# Patient Record
Sex: FEMALE | Race: WHITE | Employment: OTHER | URBAN - METROPOLITAN AREA
[De-identification: names, ages, dates, MRNs, and addresses within clinical notes are randomized per-mention and may not be internally consistent; named-entity substitution may affect disease eponyms.]

---

## 2017-05-09 ENCOUNTER — TELEPHONE (OUTPATIENT)
Dept: FAMILY MEDICINE CLINIC | Facility: CLINIC | Age: 67
End: 2017-05-09

## 2017-05-09 NOTE — TELEPHONE ENCOUNTER
Pt c/o tooth pain, states in the past- s/s related to sinus infection. Requested antibiotic. Pt call transferred to appt desk to get scheduled.     Future Appointments  Date Time Provider Nayeli Ann   5/10/2017 8:45 AM Brad Giraldo MD [facility]

## 2017-05-10 ENCOUNTER — OFFICE VISIT (OUTPATIENT)
Dept: FAMILY MEDICINE CLINIC | Facility: CLINIC | Age: 67
End: 2017-05-10

## 2017-05-10 VITALS
BODY MASS INDEX: 22.47 KG/M2 | RESPIRATION RATE: 12 BRPM | HEART RATE: 80 BPM | HEIGHT: 66 IN | TEMPERATURE: 99 F | WEIGHT: 139.81 LBS | DIASTOLIC BLOOD PRESSURE: 76 MMHG | SYSTOLIC BLOOD PRESSURE: 120 MMHG

## 2017-05-10 DIAGNOSIS — K08.89 TOOTH PAIN: ICD-10-CM

## 2017-05-10 DIAGNOSIS — J01.01 ACUTE RECURRENT MAXILLARY SINUSITIS: Primary | ICD-10-CM

## 2017-05-10 DIAGNOSIS — M77.8 TENDONITIS OF WRIST, LEFT: ICD-10-CM

## 2017-05-10 PROCEDURE — 99214 OFFICE O/P EST MOD 30 MIN: CPT | Performed by: FAMILY MEDICINE

## 2017-05-10 RX ORDER — AMOXICILLIN 500 MG/1
500 CAPSULE ORAL 3 TIMES DAILY
Qty: 30 CAPSULE | Refills: 0 | Status: SHIPPED | OUTPATIENT
Start: 2017-05-10 | End: 2017-05-20

## 2017-05-10 RX ORDER — BLOOD-GLUCOSE METER
EACH MISCELLANEOUS
COMMUNITY
Start: 2016-05-03 | End: 2019-01-05

## 2017-05-10 RX ORDER — FLAXSEED OIL 1000 MG
1 CAPSULE ORAL DAILY
COMMUNITY
Start: 2013-02-25

## 2017-05-10 RX ORDER — ASPIRIN 81 MG/1
81 TABLET, CHEWABLE ORAL DAILY
COMMUNITY
Start: 2014-02-28 | End: 2018-06-08

## 2017-05-10 RX ORDER — CALCIUM CARBONATE 300MG(750)
400 TABLET,CHEWABLE ORAL DAILY
COMMUNITY
Start: 2016-04-06 | End: 2018-06-08 | Stop reason: ALTCHOICE

## 2017-05-10 RX ORDER — FENOFIBRATE 160 MG/1
160 TABLET ORAL DAILY
COMMUNITY
Start: 2010-08-25 | End: 2017-10-18

## 2017-05-10 RX ORDER — AZELAIC ACID 0.15 G/G
GEL TOPICAL AS NEEDED
COMMUNITY
Start: 2011-09-30

## 2017-05-10 RX ORDER — OMEPRAZOLE 40 MG/1
40 CAPSULE, DELAYED RELEASE ORAL DAILY
COMMUNITY
Start: 2016-09-13 | End: 2017-05-13

## 2017-05-10 RX ORDER — BLACK COHOSH ROOT EXTRACT 80 MG
1 CAPSULE ORAL DAILY
COMMUNITY
Start: 2016-04-06 | End: 2019-01-04

## 2017-05-10 RX ORDER — CALCIUM CARBONATE/VITAMIN D3 600 MG-10
1 TABLET ORAL DAILY
COMMUNITY
Start: 2013-02-25 | End: 2018-06-08 | Stop reason: ALTCHOICE

## 2017-05-10 NOTE — PROGRESS NOTES
CHIEF COMPLAINT:   Patient presents with:  Pain: pt feels s/s related to sinuses       HPI:   Fernando Sharma is a 77year old female who presents to clinic today with complaints of TOOTH PAIN  pT WITH ISSUES TWICE IN PAST - treated for sinus and clear Smoking Status: Never Smoker                      Smokeless Status: Never Used                        Alcohol Use: Yes           0.0 oz/week       0 Standard drinks or equivalent per week       Comment: rare- one per month- wine/beer    Drug Use: No system. Tylenol and ibuprofen as appropriate. Call if not improving in 10-14 days to be re-seen. Saline nasal spray or nasal rinses will help with sinus issues. Call if new or worsening symptoms.       rec flonase bid when symptoms start    [id number] [address] for an

## 2017-05-10 NOTE — PATIENT INSTRUCTIONS
Recheck prn    Rest, fluids, hydrate,   Use mucinex DM for nasal congestin and cough. Vitamin c, zinc, and garlic to help your immune system. Tylenol and ibuprofen as appropriate. Call if not improving in 10-14 days to be re-seen.   Saline nasal spray

## 2017-05-13 RX ORDER — OMEPRAZOLE 40 MG/1
CAPSULE, DELAYED RELEASE ORAL
Qty: 90 CAPSULE | Refills: 3 | Status: SHIPPED | OUTPATIENT
Start: 2017-05-13 | End: 2018-05-05

## 2017-10-18 RX ORDER — FENOFIBRATE 160 MG/1
TABLET ORAL
Qty: 90 TABLET | Refills: 0 | Status: SHIPPED | OUTPATIENT
Start: 2017-10-18 | End: 2017-10-30

## 2017-10-18 NOTE — TELEPHONE ENCOUNTER
Future appt:    Last Appointment:  5/10/2017  Last annual exam:  10/24/16  Last lab:  10/25/16  Last refilled:  1/20/17 - #90 with 2 refills was given    Pt informed that she is due for her annual exam  Pt is also needing test strips- request started.   Josse

## 2017-10-24 ENCOUNTER — TELEPHONE (OUTPATIENT)
Dept: FAMILY MEDICINE CLINIC | Facility: CLINIC | Age: 67
End: 2017-10-24

## 2017-10-24 DIAGNOSIS — Z00.00 ANNUAL PHYSICAL EXAM: ICD-10-CM

## 2017-10-24 DIAGNOSIS — E11.9 CONTROLLED TYPE 2 DIABETES MELLITUS WITHOUT COMPLICATION, WITHOUT LONG-TERM CURRENT USE OF INSULIN (HCC): Primary | ICD-10-CM

## 2017-10-24 NOTE — TELEPHONE ENCOUNTER
Future appt:     Your appointments     Date & Time Appointment Department [facility])    Oct 26, 2017  9:15 AM CDT Laboratory Visit with REF [facility] ([facility])    Oct 30, 2017  9:00 AM CDT Medicare Annual Well Visit with Juan M Darden

## 2017-10-24 NOTE — TELEPHONE ENCOUNTER
----- Message from Valerie Campos sent at 10/24/2017  2:20 PM CDT -----  Regarding: lab orders needed   Patient has lab appointment on 10/25/17 could you please put lab orders in system.           Thanks,  Lora

## 2017-10-26 ENCOUNTER — LABORATORY ENCOUNTER (OUTPATIENT)
Dept: LAB | Age: 67
End: 2017-10-26
Attending: FAMILY MEDICINE
Payer: MEDICARE

## 2017-10-26 DIAGNOSIS — E11.9 CONTROLLED TYPE 2 DIABETES MELLITUS WITHOUT COMPLICATION, WITHOUT LONG-TERM CURRENT USE OF INSULIN (HCC): ICD-10-CM

## 2017-10-26 DIAGNOSIS — Z00.00 ANNUAL PHYSICAL EXAM: ICD-10-CM

## 2017-10-26 PROCEDURE — 82306 VITAMIN D 25 HYDROXY: CPT

## 2017-10-26 PROCEDURE — 80053 COMPREHEN METABOLIC PANEL: CPT

## 2017-10-26 PROCEDURE — 83036 HEMOGLOBIN GLYCOSYLATED A1C: CPT

## 2017-10-26 PROCEDURE — 85025 COMPLETE CBC W/AUTO DIFF WBC: CPT

## 2017-10-26 PROCEDURE — 82043 UR ALBUMIN QUANTITATIVE: CPT

## 2017-10-26 PROCEDURE — 81003 URINALYSIS AUTO W/O SCOPE: CPT

## 2017-10-26 PROCEDURE — 82570 ASSAY OF URINE CREATININE: CPT

## 2017-10-26 PROCEDURE — 84443 ASSAY THYROID STIM HORMONE: CPT

## 2017-10-26 PROCEDURE — 80061 LIPID PANEL: CPT

## 2017-10-26 PROCEDURE — 36415 COLL VENOUS BLD VENIPUNCTURE: CPT

## 2017-10-30 ENCOUNTER — OFFICE VISIT (OUTPATIENT)
Dept: FAMILY MEDICINE CLINIC | Facility: CLINIC | Age: 67
End: 2017-10-30

## 2017-10-30 VITALS
WEIGHT: 135.5 LBS | TEMPERATURE: 98 F | BODY MASS INDEX: 22.04 KG/M2 | DIASTOLIC BLOOD PRESSURE: 62 MMHG | HEIGHT: 65.75 IN | RESPIRATION RATE: 16 BRPM | HEART RATE: 62 BPM | SYSTOLIC BLOOD PRESSURE: 124 MMHG

## 2017-10-30 DIAGNOSIS — E11.9 CONTROLLED TYPE 2 DIABETES MELLITUS WITHOUT COMPLICATION, WITHOUT LONG-TERM CURRENT USE OF INSULIN (HCC): ICD-10-CM

## 2017-10-30 DIAGNOSIS — Z23 NEED FOR VACCINATION: ICD-10-CM

## 2017-10-30 DIAGNOSIS — E78.49 FAMILIAL MULTIPLE LIPOPROTEIN-TYPE HYPERLIPIDEMIA: ICD-10-CM

## 2017-10-30 DIAGNOSIS — K21.9 GASTROESOPHAGEAL REFLUX DISEASE WITHOUT ESOPHAGITIS: ICD-10-CM

## 2017-10-30 DIAGNOSIS — Z13.31 DEPRESSION SCREENING: ICD-10-CM

## 2017-10-30 DIAGNOSIS — Z00.00 ENCOUNTER FOR ANNUAL HEALTH EXAMINATION: Primary | ICD-10-CM

## 2017-10-30 DIAGNOSIS — M77.8 TENDONITIS OF WRIST, LEFT: ICD-10-CM

## 2017-10-30 DIAGNOSIS — D12.6 BENIGN NEOPLASM OF COLON, UNSPECIFIED PART OF COLON: ICD-10-CM

## 2017-10-30 DIAGNOSIS — E55.9 VITAMIN D DEFICIENCY: ICD-10-CM

## 2017-10-30 PROBLEM — J01.01 ACUTE RECURRENT MAXILLARY SINUSITIS: Status: RESOLVED | Noted: 2017-05-10 | Resolved: 2017-10-30

## 2017-10-30 PROBLEM — K08.89 TOOTH PAIN: Status: RESOLVED | Noted: 2017-05-10 | Resolved: 2017-10-30

## 2017-10-30 PROCEDURE — G0444 DEPRESSION SCREEN ANNUAL: HCPCS | Performed by: FAMILY MEDICINE

## 2017-10-30 PROCEDURE — 99213 OFFICE O/P EST LOW 20 MIN: CPT | Performed by: FAMILY MEDICINE

## 2017-10-30 PROCEDURE — G0009 ADMIN PNEUMOCOCCAL VACCINE: HCPCS | Performed by: FAMILY MEDICINE

## 2017-10-30 PROCEDURE — 90732 PPSV23 VACC 2 YRS+ SUBQ/IM: CPT | Performed by: FAMILY MEDICINE

## 2017-10-30 PROCEDURE — G0439 PPPS, SUBSEQ VISIT: HCPCS | Performed by: FAMILY MEDICINE

## 2017-10-30 PROCEDURE — 93000 ELECTROCARDIOGRAM COMPLETE: CPT | Performed by: FAMILY MEDICINE

## 2017-10-30 RX ORDER — FENOFIBRATE 160 MG/1
TABLET ORAL
Qty: 90 TABLET | Refills: 3 | Status: SHIPPED | OUTPATIENT
Start: 2017-10-30 | End: 2018-11-16

## 2017-10-30 NOTE — PROGRESS NOTES
CC: Annual Physical Exam    HPI:   Cuate Harrison is a 79year old female who presents for a complete physical exam.     Patient generally feeling well. Patient monitoring her glucose.   She does note that number seems to be higher in the hemoglobin A1c REFLEX TO FREE T4   Result Value Ref Range   TSH 2.820 0.350 - 5.500 mIU/mL   -URINALYSIS WITH CULTURE REFLEX   Result Value Ref Range   Urine Color Straw Yellow   Clarity Urine Clear Clear   Spec Gravity <1.005 1.001 - 1.030   Glucose Urine Negative Negat tablet Rfl: 3   Glucose Blood In Vitro Strip DX:  E11.9- testing once daily Disp: 100 strip Rfl: 0   OMEPRAZOLE 40 MG Oral Capsule Delayed Release TAKE ONE CAPSULE BY MOUTH ONCE DAILY Disp: 90 capsule Rfl: 3   aspirin 81 MG Oral Chew Tab Chew 81 mg by [employer] vision or yellow sclerae. Ears, Nose, Throat:  Denies hearing loss, sneezing, congestion, runny nose or sore throat. INTEGUMENTARY:  Denies rashes, itching, skin lesion, or excessive skin dryness.   CARDIOVASCULAR:  Denies chest pain, chest pressure, chest Supple, no JVD, no carotid bruit, no thyromegaly. SKIN: No rashes, no skin lesion, no bruising, good turgor. HEART:  Regular rate and rhythm, no murmurs, rubs or gallops. LUNGS: Clear to auscultation bilterally, no rales/rhonchi/wheezing.   BREAST: No sk OFFICE/OUTPT VISIT,EST,LEVL III    5. Gastroesophageal reflux disease without esophagitis  Stable with medications  - OFFICE/OUTPT VISIT,EST,LEVL III    6. Tendonitis of wrist, left      7. Depression screening    - DEPRESSION SCREEN ANNUAL    8.  Vitamin D found.     Fasting Blood Sugar (FSB)   Patient must be diagnosed with one of the following:   • Hypertension   • Dyslipidemia   • Obesity (BMI ³30 kg/m2)   • Previous elevated impaired FBS or GTT   … or any two of the following:   • Overweight (BMI ³25 but annually for Diabetics, people with Glaucoma family history,   Americans over age 48   Americans over age 72 No flowsheet data found.  OK to schedule if you are in this risk group, make sure you have a referral   Bone Density Screening      B Tetanus Toxoid- Only covered with a cut with metal- TD and TDaP Not covered by Medicare Part B) No orders found for this or any previous visit.  This may be covered with your prescription benefits, but Medicare does not cover unless Medically needed    Zost DENSITOMETRY (CPT=77080)    The patient is asked to return in 6 month.

## 2017-10-30 NOTE — PATIENT INSTRUCTIONS
Fasting lab results reviewed    Increase dose of metformin, monitor for looser stools on higher dose of medication    Rec vit D 2000 IU a day    Results of mammogram pending    Encourage heart healthy diet, continue fenofibrate, statin and lopid failed in 65-75) IPPE only No results found for this or any previous visit.  Limited to patients who meet one of the following criteria:   • Men who are 73-68 years old and have smoked more than 100 cigarettes in their lifetime   • Anyone with a family history    Col 10/26/2018 Please get this Mammogram regularly   Immunizations      Influenza  Covered Annually No orders found for this or any previous visit.  Please get every year    Pneumococcal 13 (Prevnar)  Covered Once after 65 No orders found for this or any previo

## 2017-11-02 ENCOUNTER — MED REC SCAN ONLY (OUTPATIENT)
Dept: FAMILY MEDICINE CLINIC | Facility: CLINIC | Age: 67
End: 2017-11-02

## 2017-12-05 ENCOUNTER — TELEPHONE (OUTPATIENT)
Dept: FAMILY MEDICINE CLINIC | Facility: CLINIC | Age: 67
End: 2017-12-05

## 2017-12-05 NOTE — TELEPHONE ENCOUNTER
RX for testing supplies signed and faxed today- with instructions that pt is testing once daily- 6 month supply was approved. Pt told pharmacy that she is testing twice daily. Pharmacy needs authorization to increase testing instructions. .  Pt last seen

## 2017-12-05 NOTE — TELEPHONE ENCOUNTER
Pharmacy had to do an insurnace review due to her diabetic history. Pharmacy is asking why pt is not taking a statin -  They are having to follow ADA recommendations re: prevention of heart attack and stroke.     Pharmacy noticed pt is taking Fenobrate -

## 2017-12-05 NOTE — TELEPHONE ENCOUNTER
Pt has hx of hypertriglyceridemia . Triglycerides > 500. Pt lipids panel responsive to current medications with triglycerides and cholesterol in target range. We were not able to achieve this goal with statin therapy.   Pt should continue on present medicat

## 2017-12-11 ENCOUNTER — TELEPHONE (OUTPATIENT)
Dept: FAMILY MEDICINE CLINIC | Facility: CLINIC | Age: 67
End: 2017-12-11

## 2017-12-11 NOTE — TELEPHONE ENCOUNTER
Spoke with pharmacy, since pt is testing BID- they need quantity for strips increased to #200.   They will refax RX request.

## 2017-12-12 NOTE — TELEPHONE ENCOUNTER
Requested RX faxed for One Touch Ultra testing supplies. Pt is non-insulin- checking blood sugars BID. #200 w/ 3 refills approved - dx: E11.9  RX from [facility] completed and faxed back.

## 2017-12-19 ENCOUNTER — PATIENT OUTREACH (OUTPATIENT)
Dept: FAMILY MEDICINE CLINIC | Facility: CLINIC | Age: 67
End: 2017-12-19

## 2017-12-20 ENCOUNTER — OFFICE VISIT (OUTPATIENT)
Dept: FAMILY MEDICINE CLINIC | Facility: CLINIC | Age: 67
End: 2017-12-20

## 2017-12-20 VITALS
TEMPERATURE: 97 F | BODY MASS INDEX: 21.69 KG/M2 | HEART RATE: 72 BPM | RESPIRATION RATE: 20 BRPM | HEIGHT: 65.75 IN | WEIGHT: 133.38 LBS | DIASTOLIC BLOOD PRESSURE: 50 MMHG | SYSTOLIC BLOOD PRESSURE: 120 MMHG

## 2017-12-20 DIAGNOSIS — J02.9 SORE THROAT: Primary | ICD-10-CM

## 2017-12-20 PROCEDURE — 87081 CULTURE SCREEN ONLY: CPT | Performed by: NURSE PRACTITIONER

## 2017-12-20 PROCEDURE — 87880 STREP A ASSAY W/OPTIC: CPT | Performed by: NURSE PRACTITIONER

## 2017-12-20 PROCEDURE — 99213 OFFICE O/P EST LOW 20 MIN: CPT | Performed by: NURSE PRACTITIONER

## 2017-12-20 NOTE — PROGRESS NOTES
HPI:    Patient ID: Janna Brown is a 79year old female. HPI     Works at [employer] and has been exposed to several who are sick. Has a ST for the past 3 days. Otherwise feels ok. Review of Systems   Constitutional: Negative.     HENT: Pos well-nourished. No distress. HENT:   Head: Normocephalic and atraumatic. Right Ear: Hearing, tympanic membrane, external ear and ear canal normal.   Left Ear: Hearing, tympanic membrane, external ear and ear canal normal.   Nose: Mucosal edema present.

## 2017-12-20 NOTE — PATIENT INSTRUCTIONS
Rapid strep negative, culture pending.    Take acetaminophen or ibuprofen for fever/discomfort  Drink plenty of fluids, warm liquids  Decongestants for congestion  Expectorant and/or cough suppressant  Use saline drops as needed  Use cool mist vaporizer to

## 2017-12-22 ENCOUNTER — TELEPHONE (OUTPATIENT)
Dept: FAMILY MEDICINE CLINIC | Facility: CLINIC | Age: 67
End: 2017-12-22

## 2017-12-22 NOTE — TELEPHONE ENCOUNTER
----- Message from Gladstone Cowden, APN sent at 12/22/2017  8:25 AM CST -----  Note to my chart. Strep culture is negative.

## 2018-02-14 ENCOUNTER — TELEPHONE (OUTPATIENT)
Dept: FAMILY MEDICINE CLINIC | Facility: CLINIC | Age: 68
End: 2018-02-14

## 2018-02-14 NOTE — TELEPHONE ENCOUNTER
Blood Sugar Log reviewed per CR dated 1/1/8- 2/14/18  AM- BS range:  107-165. Per CR- reviewed- pt to continue present management. Left detailed message for pt- asked Luly Toribio to call back with any questions/concerns.

## 2018-04-27 DIAGNOSIS — E11.9 CONTROLLED TYPE 2 DIABETES MELLITUS WITHOUT COMPLICATION, WITHOUT LONG-TERM CURRENT USE OF INSULIN (HCC): Primary | ICD-10-CM

## 2018-04-27 NOTE — TELEPHONE ENCOUNTER
Future appt:   Patient was instructed to return 6 months from last OV (10/30/17)    Last Appointment:  10/30/2017    Medication last refilled on 10/30/17    Cholesterol, Total (mg/dL)   Date Value   10/26/2017 161   ----------  HDL Cholesterol (mg/dL)

## 2018-04-28 NOTE — TELEPHONE ENCOUNTER
Patient here for appointment with her mother - seeing Dr. Andreas Suárez  Patient notified and expressed understanding  Will make appointment on the way out today    Ford Horne, 04/28/18, 10:27 AM

## 2018-05-05 RX ORDER — OMEPRAZOLE 40 MG/1
CAPSULE, DELAYED RELEASE ORAL
Qty: 90 CAPSULE | Refills: 3 | Status: SHIPPED | OUTPATIENT
Start: 2018-05-05 | End: 2018-06-08 | Stop reason: ALTCHOICE

## 2018-05-05 NOTE — TELEPHONE ENCOUNTER
Refill request from [facility] for omeprazole dr 40 mg capsule. Future appt:     Your appointments     Date & Time Appointment Department [facility])    Jun 04, 2018  8:45 AM CDT Laboratory Visit with REF [facility] ([facility])

## 2018-05-23 ENCOUNTER — TELEPHONE (OUTPATIENT)
Dept: FAMILY MEDICINE CLINIC | Facility: CLINIC | Age: 68
End: 2018-05-23

## 2018-05-23 NOTE — TELEPHONE ENCOUNTER
Pt states that she has had back pain and left shoulder since Saturday. Pt states that she has been seen by PT and chiropractor and has not had any relief. Wants to know if she can be prescribed a muscle relaxant.

## 2018-05-23 NOTE — TELEPHONE ENCOUNTER
I have no record of this being evaluated here.  She would need an appointment to start a muscle relaxant if indicated at time of exam.

## 2018-05-23 NOTE — TELEPHONE ENCOUNTER
Pt notified and verbalized understanding. She states that she is seeing PT again tomorrow and will see if that helps before scheduling an appt here.

## 2018-05-23 NOTE — TELEPHONE ENCOUNTER
Pt states that she has a \"knot\" in the muscle under her left shoulder blade since Saturday. She has seen PT x 2 and a chiropractor with no improvement. She is asking if you think that a muscle relaxant would be helpful. Please advise.

## 2018-06-04 ENCOUNTER — APPOINTMENT (OUTPATIENT)
Dept: LAB | Age: 68
End: 2018-06-04
Attending: FAMILY MEDICINE
Payer: MEDICARE

## 2018-06-04 DIAGNOSIS — E11.9 CONTROLLED TYPE 2 DIABETES MELLITUS WITHOUT COMPLICATION, WITHOUT LONG-TERM CURRENT USE OF INSULIN (HCC): ICD-10-CM

## 2018-06-04 LAB
ALBUMIN SERPL-MCNC: 4.3 G/DL (ref 3.5–4.8)
ALP LIVER SERPL-CCNC: 40 U/L (ref 55–142)
ALT SERPL-CCNC: 33 U/L (ref 14–54)
AST SERPL-CCNC: 24 U/L (ref 15–41)
BILIRUB SERPL-MCNC: 0.4 MG/DL (ref 0.1–2)
BILIRUB UR QL STRIP.AUTO: NEGATIVE
BUN BLD-MCNC: 16 MG/DL (ref 8–20)
CALCIUM BLD-MCNC: 9.5 MG/DL (ref 8.3–10.3)
CHLORIDE: 106 MMOL/L (ref 101–111)
CHOLEST SMN-MCNC: 156 MG/DL (ref ?–200)
CLARITY UR REFRACT.AUTO: CLEAR
CO2: 28 MMOL/L (ref 22–32)
CREAT BLD-MCNC: 0.74 MG/DL (ref 0.55–1.02)
EST. AVERAGE GLUCOSE BLD GHB EST-MCNC: 146 MG/DL (ref 68–126)
GLUCOSE BLD-MCNC: 110 MG/DL (ref 70–99)
GLUCOSE UR STRIP.AUTO-MCNC: NEGATIVE MG/DL
HBA1C MFR BLD HPLC: 6.7 % (ref ?–5.7)
HDLC SERPL-MCNC: 37 MG/DL (ref 45–?)
HDLC SERPL: 4.22 {RATIO} (ref ?–4.44)
KETONES UR STRIP.AUTO-MCNC: NEGATIVE MG/DL
LDLC SERPL CALC-MCNC: 91 MG/DL (ref ?–130)
LEUKOCYTE ESTERASE UR QL STRIP.AUTO: NEGATIVE
M PROTEIN MFR SERPL ELPH: 7.9 G/DL (ref 6.1–8.3)
NITRITE UR QL STRIP.AUTO: NEGATIVE
NONHDLC SERPL-MCNC: 119 MG/DL (ref ?–130)
PH UR STRIP.AUTO: 8 [PH] (ref 4.5–8)
POTASSIUM SERPL-SCNC: 4.4 MMOL/L (ref 3.6–5.1)
PROT UR STRIP.AUTO-MCNC: NEGATIVE MG/DL
RBC UR QL AUTO: NEGATIVE
SODIUM SERPL-SCNC: 141 MMOL/L (ref 136–144)
SP GR UR STRIP.AUTO: <1.005 (ref 1–1.03)
TRIGL SERPL-MCNC: 139 MG/DL (ref ?–150)
UROBILINOGEN UR STRIP.AUTO-MCNC: <2 MG/DL
VLDLC SERPL CALC-MCNC: 28 MG/DL (ref 5–40)

## 2018-06-04 PROCEDURE — 81003 URINALYSIS AUTO W/O SCOPE: CPT

## 2018-06-04 PROCEDURE — 80053 COMPREHEN METABOLIC PANEL: CPT

## 2018-06-04 PROCEDURE — 80061 LIPID PANEL: CPT

## 2018-06-04 PROCEDURE — 36415 COLL VENOUS BLD VENIPUNCTURE: CPT

## 2018-06-04 PROCEDURE — 83036 HEMOGLOBIN GLYCOSYLATED A1C: CPT

## 2018-06-08 ENCOUNTER — OFFICE VISIT (OUTPATIENT)
Dept: FAMILY MEDICINE CLINIC | Facility: CLINIC | Age: 68
End: 2018-06-08

## 2018-06-08 VITALS
TEMPERATURE: 97 F | DIASTOLIC BLOOD PRESSURE: 80 MMHG | HEIGHT: 65.75 IN | OXYGEN SATURATION: 98 % | RESPIRATION RATE: 16 BRPM | SYSTOLIC BLOOD PRESSURE: 136 MMHG | WEIGHT: 133.63 LBS | BODY MASS INDEX: 21.73 KG/M2 | HEART RATE: 74 BPM

## 2018-06-08 DIAGNOSIS — E11.9 CONTROLLED TYPE 2 DIABETES MELLITUS WITHOUT COMPLICATION, WITHOUT LONG-TERM CURRENT USE OF INSULIN (HCC): Primary | ICD-10-CM

## 2018-06-08 DIAGNOSIS — Z13.820 SCREENING FOR OSTEOPOROSIS: ICD-10-CM

## 2018-06-08 DIAGNOSIS — E78.49 FAMILIAL MULTIPLE LIPOPROTEIN-TYPE HYPERLIPIDEMIA: ICD-10-CM

## 2018-06-08 DIAGNOSIS — D12.6 BENIGN NEOPLASM OF COLON, UNSPECIFIED PART OF COLON: ICD-10-CM

## 2018-06-08 DIAGNOSIS — K27.9 PUD (PEPTIC ULCER DISEASE): ICD-10-CM

## 2018-06-08 DIAGNOSIS — M77.8 TENDONITIS OF WRIST, LEFT: ICD-10-CM

## 2018-06-08 DIAGNOSIS — K21.9 GASTROESOPHAGEAL REFLUX DISEASE WITHOUT ESOPHAGITIS: ICD-10-CM

## 2018-06-08 PROBLEM — K63.5 COLON POLYP: Status: ACTIVE | Noted: 2018-06-08

## 2018-06-08 PROCEDURE — 99214 OFFICE O/P EST MOD 30 MIN: CPT | Performed by: FAMILY MEDICINE

## 2018-06-08 RX ORDER — PANTOPRAZOLE SODIUM 40 MG/1
40 TABLET, DELAYED RELEASE ORAL DAILY
COMMUNITY
Start: 2018-05-22 | End: 2020-06-15

## 2018-06-08 RX ORDER — MULTIVITAMIN
1 TABLET ORAL DAILY
COMMUNITY
End: 2019-01-04

## 2018-06-08 NOTE — PROGRESS NOTES
[place] [facility] [place]  PROGRESS NOTE  Chief Complaint:   Patient presents with:  Medication Follow-Up      HPI:   This is a 79year old female coming in for diabetic  Labs reviewed.   Pt retired 5 days ago- happt and building new home  MIL now in [employer] Urine Negative Negative mg/dl   Urobilinogen Urine <2.0 0.2 - 2.0 mg/dL   Nitrite Urine Negative Negative   Leukocyte Esterase Urine Negative Negative   Microscopic Microscopic not indicated        Wt Readings from Last 6 Encounters:  06/08/18 : 133 lb 9.6 testing two times per day-  Dx:  E11.9 Disp:  Rfl:    Flaxseed, Linseed, (RA FLAX SEED OIL 1000) 1000 MG Oral Cap Take 1 capsule by mouth daily.    Disp:  Rfl:    ONETOUCH DELICA LANCETS FINE Does not apply Misc Testing BID-  Dx:  E11.9 Disp:  Rfl:    Lacto encounter: 133 lb 9.6 oz. Vital signs reviewed. Appears stated age, well groomed. Physical Exam:  GEN:  Patient is alert, awake and oriented, well developed, well nourished, no apparent distress.   HEENT:  Head:  Normocephalic, atraumatic Eyes: EOMI, PERR and meds to manage diabetes      Continue massage left shoulder    Recheck labs - diabetes 4-6 months    Physical and f.u early November      Meds & Refills for this Visit:    No prescriptions requested or ordered in this encounter       Health Maintenance

## 2018-06-08 NOTE — PROGRESS NOTES
[facility] [place]  PROGRESS NOTE  Chief Complaint:   Patient presents with:  Medication Follow-Up      HPI:   This is a 79year old female coming in for  HPI    Results for orders placed or performed in visit on 95/42/22  -COMP METABOLIC PANEL Surgical History:  2013: COLONOSCOPY  Social History:  Social History    Marital status:              Spouse name:                       Years of education:                 Number of children:               Social History Main Topics    Smoking stat Answered       REVIEW OF SYSTEMS:   CONSTITUTIONAL:  Denies unusual weight gain/loss, fever, chills, or fatigue. EENT:  Eyes:  Denies eye pain, visual loss, blurred vision, double vision or yellow sclerae.  Ears, Nose, Throat:  Denies hearing loss, sneezin discharge Throat:  No tonsillar erythema or exudate. Mouth:  No oral lesions or ulcerations, good dentition. NECK: Supple, no thyromegaly. SKIN: No rashes, no skin lesion, no bruising, good turgor.   HEART:  Regular rate and rhythm, no murmurs, rubs or g

## 2018-06-08 NOTE — PATIENT INSTRUCTIONS
f/u RAGHAV re tendonitiis    NO aspirin or NSAID  EGD 3 months    Continue diet and meds to manage diabetes      Continue massage left shoulder    Recheck labs - diabetes 4-6 months    Physical and f.u early November

## 2018-08-31 ENCOUNTER — TELEPHONE (OUTPATIENT)
Dept: FAMILY MEDICINE CLINIC | Facility: CLINIC | Age: 68
End: 2018-08-31

## 2018-08-31 DIAGNOSIS — E11.9 CONTROLLED TYPE 2 DIABETES MELLITUS WITHOUT COMPLICATION, WITHOUT LONG-TERM CURRENT USE OF INSULIN (HCC): Primary | ICD-10-CM

## 2018-08-31 DIAGNOSIS — E78.49 FAMILIAL MULTIPLE LIPOPROTEIN-TYPE HYPERLIPIDEMIA: ICD-10-CM

## 2018-08-31 NOTE — TELEPHONE ENCOUNTER
Patient scheduled her medicare wellness for 11/5/18, need an order for her lab work which is scheduled 11/3/18.   Ty

## 2018-08-31 NOTE — TELEPHONE ENCOUNTER
Future appt:     Your appointments     Date & Time Appointment Department [facility])    Nov 02, 2018  8:45 AM CDT Laboratory Visit with REF [facility] ([facility])    Nov 05, 2018  9:00 AM CST Medicare Annual Well Visit with Franklin Henriquez

## 2018-09-04 NOTE — TELEPHONE ENCOUNTER
Future appt:     Your appointments     Date & Time Appointment Department [facility])    Nov 02, 2018  8:45 AM CDT Laboratory Visit with REF [facility] ([facility])    Nov 05, 2018  9:00 AM CST Medicare Annual Well Visit with Yuki Dean

## 2018-10-30 ENCOUNTER — TELEPHONE (OUTPATIENT)
Dept: FAMILY MEDICINE CLINIC | Facility: CLINIC | Age: 68
End: 2018-10-30

## 2018-10-30 NOTE — TELEPHONE ENCOUNTER
Future Appointments   Date Time Provider Nayeli Ann   11/2/2018  8:45 AM [facility] [place] [facility]   11/5/2018  9:00 AM Lissette Curiel MD [facility] [place] [facility]     Pt states she would like to have additional labs ordered to check for

## 2018-10-30 NOTE — TELEPHONE ENCOUNTER
Pt informed- states she will discuss further at upcoming appt. Pt wanted to keep appt as is at this time.

## 2018-10-30 NOTE — TELEPHONE ENCOUNTER
Pt may want to get orders from ortho if they were suggesting . Please have her request records for our review. Pt has physical scheduled next week.  Does pt want to change her appt to problem focused or make another appt for wrist issues and keep her physic

## 2018-11-02 ENCOUNTER — LABORATORY ENCOUNTER (OUTPATIENT)
Dept: LAB | Age: 68
End: 2018-11-02
Attending: FAMILY MEDICINE
Payer: MEDICARE

## 2018-11-02 DIAGNOSIS — E78.49 FAMILIAL MULTIPLE LIPOPROTEIN-TYPE HYPERLIPIDEMIA: ICD-10-CM

## 2018-11-02 DIAGNOSIS — E11.9 CONTROLLED TYPE 2 DIABETES MELLITUS WITHOUT COMPLICATION, WITHOUT LONG-TERM CURRENT USE OF INSULIN (HCC): ICD-10-CM

## 2018-11-02 PROCEDURE — 82570 ASSAY OF URINE CREATININE: CPT

## 2018-11-02 PROCEDURE — 84443 ASSAY THYROID STIM HORMONE: CPT

## 2018-11-02 PROCEDURE — 80053 COMPREHEN METABOLIC PANEL: CPT

## 2018-11-02 PROCEDURE — 36415 COLL VENOUS BLD VENIPUNCTURE: CPT

## 2018-11-02 PROCEDURE — 80061 LIPID PANEL: CPT

## 2018-11-02 PROCEDURE — 82043 UR ALBUMIN QUANTITATIVE: CPT

## 2018-11-02 PROCEDURE — 83036 HEMOGLOBIN GLYCOSYLATED A1C: CPT

## 2018-11-02 PROCEDURE — 85025 COMPLETE CBC W/AUTO DIFF WBC: CPT

## 2018-11-02 PROCEDURE — 81003 URINALYSIS AUTO W/O SCOPE: CPT

## 2018-11-05 ENCOUNTER — APPOINTMENT (OUTPATIENT)
Dept: LAB | Age: 68
End: 2018-11-05
Attending: FAMILY MEDICINE
Payer: COMMERCIAL

## 2018-11-05 ENCOUNTER — OFFICE VISIT (OUTPATIENT)
Dept: FAMILY MEDICINE CLINIC | Facility: CLINIC | Age: 68
End: 2018-11-05
Payer: MEDICARE

## 2018-11-05 VITALS
WEIGHT: 133.63 LBS | RESPIRATION RATE: 16 BRPM | BODY MASS INDEX: 22 KG/M2 | TEMPERATURE: 97 F | SYSTOLIC BLOOD PRESSURE: 118 MMHG | HEART RATE: 80 BPM | DIASTOLIC BLOOD PRESSURE: 66 MMHG | HEIGHT: 65.5 IN

## 2018-11-05 DIAGNOSIS — E78.49 FAMILIAL MULTIPLE LIPOPROTEIN-TYPE HYPERLIPIDEMIA: ICD-10-CM

## 2018-11-05 DIAGNOSIS — K21.9 GASTROESOPHAGEAL REFLUX DISEASE WITHOUT ESOPHAGITIS: ICD-10-CM

## 2018-11-05 DIAGNOSIS — R91.1 PULMONARY NODULE: ICD-10-CM

## 2018-11-05 DIAGNOSIS — D12.2 ADENOMATOUS POLYP OF ASCENDING COLON: ICD-10-CM

## 2018-11-05 DIAGNOSIS — E11.9 CONTROLLED TYPE 2 DIABETES MELLITUS WITHOUT COMPLICATION, WITHOUT LONG-TERM CURRENT USE OF INSULIN (HCC): ICD-10-CM

## 2018-11-05 DIAGNOSIS — Z00.00 ENCOUNTER FOR ANNUAL HEALTH EXAMINATION: Primary | ICD-10-CM

## 2018-11-05 DIAGNOSIS — K27.9 PUD (PEPTIC ULCER DISEASE): ICD-10-CM

## 2018-11-05 DIAGNOSIS — M77.8 TENDONITIS OF WRIST, LEFT: ICD-10-CM

## 2018-11-05 DIAGNOSIS — Z13.31 DEPRESSION SCREENING: ICD-10-CM

## 2018-11-05 PROCEDURE — 86038 ANTINUCLEAR ANTIBODIES: CPT | Performed by: FAMILY MEDICINE

## 2018-11-05 PROCEDURE — G0439 PPPS, SUBSEQ VISIT: HCPCS | Performed by: FAMILY MEDICINE

## 2018-11-05 PROCEDURE — 84550 ASSAY OF BLOOD/URIC ACID: CPT | Performed by: FAMILY MEDICINE

## 2018-11-05 PROCEDURE — 86200 CCP ANTIBODY: CPT | Performed by: FAMILY MEDICINE

## 2018-11-05 PROCEDURE — 99212 OFFICE O/P EST SF 10 MIN: CPT | Performed by: FAMILY MEDICINE

## 2018-11-05 PROCEDURE — 85652 RBC SED RATE AUTOMATED: CPT | Performed by: FAMILY MEDICINE

## 2018-11-05 PROCEDURE — 86140 C-REACTIVE PROTEIN: CPT | Performed by: FAMILY MEDICINE

## 2018-11-05 PROCEDURE — 86431 RHEUMATOID FACTOR QUANT: CPT | Performed by: FAMILY MEDICINE

## 2018-11-05 PROCEDURE — G0444 DEPRESSION SCREEN ANNUAL: HCPCS | Performed by: FAMILY MEDICINE

## 2018-11-05 PROCEDURE — 36415 COLL VENOUS BLD VENIPUNCTURE: CPT | Performed by: FAMILY MEDICINE

## 2018-11-05 NOTE — PROGRESS NOTES
CC: Annual Physical Exam    HPI:   Anuj Pozo is a 76year old female who presents for a complete physical exam.    Moved to new home this week, [place] property, will be tearing down old home. continued left wrist tendinitis.  Pt cared fo Friends    If you are a male age 38-65 or a female age 47-67, do you take aspirin?: No    Have you had any immunizations at another office such as Influenza, Hepatitis B, Tetanus, or Pneumococcal?: Yes     Functional Ability     Bathing or Showering: Able for orders placed or performed in visit on 11/05/18   RHEUMATOID ARTHRITIS FACTOR   Result Value Ref Range    Rheumatoid Factor <10 <15 IU/mL   URIC ACID, SERUM   Result Value Ref Range    Uric Acid 3.4 2.4 - 8.0 mg/dL   SED RATE, [place] (AUTOMATED) Grandmother       Social History:   Social History    Socioeconomic History      Marital status:       Spouse name: Not on file      Number of children: Not on file      Years of education: Not on file      Highest education level: Not on file    So paralysis, ataxia, numbness or tingling in the extremities,change in bowel or bladder control. HEMATOLOGIC:  Denies anemia, bleeding or bruising. LYMPHATICS:  Denies enlarged nodes  PSYCHIATRIC:  Denies depression or anxiety.   ENDOCRINOLOGIC:  Denies exc able to extend from a neutral position 25 degrees she can flex about 45 degrees. She has pain with palpation over the dorsal aspect. There is no redness. Significant warmth.   NEURO:  No deficit, normal gait, strength and tone, sensory intact, normal ref Screening (Medicare, Annual) []      Encounter for annual health examination  (primary encounter diagnosis)  Depression screening  Familial multiple lipoprotein-type hyperlipidemia  Controlled type 2 diabetes mellitus without complication, without stacie Value   11/02/2018 77        EKG - covered if needed at Welcome to Medicare, and non-screening if indicated for medical reasons Electrocardiogram date10/30/2017 Routine EKG is not a screening covered service except at the [place] to Medicare Visit    Abdom every 2 yrs up to age 79 or Yearly if High Risk   There are no preventive care reminders to display for this patient. Chlamydia  Annually if high risk No results found for: CHLAMYDIA No flowsheet data found.     Screening Mammogram      Mammogram    Rec including definitions of the different types of Advance Directives. It also has the State forms available on it's website for anyone to review and print using their home computer and printer. (the forms are also available in [place]  www. [employer]. or

## 2018-11-05 NOTE — PATIENT INSTRUCTIONS
rec labs-- rheumatology panel    Consider prednisone for trial anti-inflammatory    Shingrix vaccine-- shingle    continune other care    rec mammogram          Nkechi Juarezr Bandar's SCREENING SCHEDULE   Tests on this list are recommended by your physician b than 100 cigarettes in their lifetime   • Anyone with a family history    Colorectal Cancer Screening  Covered up to Age 76     Colonoscopy Screen   Covered every 10 years- more often if abnormal Colonoscopy due on 05/01/2023 Update [facility] if a every year    Pneumococcal 13 (Prevnar)  Covered Once after 65 No orders found for this or any previous visit.  Please get once after your 65th birthday    Pneumococcal 23 (Pneumovax)  Covered Once after 65 Orders placed or performed in visit on 10/30/17

## 2018-11-16 RX ORDER — FENOFIBRATE 160 MG/1
TABLET ORAL
Qty: 90 TABLET | Refills: 3 | Status: SHIPPED | OUTPATIENT
Start: 2018-11-16 | End: 2019-11-13

## 2018-11-16 NOTE — TELEPHONE ENCOUNTER
Future appt:    Last Appointment:  11/5/2018 (px)      Fenofibrate refilled 10/30/17 for one year      Cholesterol, Total (mg/dL)   Date Value   11/02/2018 136     HDL Cholesterol (mg/dL)   Date Value   11/02/2018 49     LDL Cholesterol (mg/dL)   Date Valu

## 2018-11-17 RX ORDER — FENOFIBRATE 160 MG/1
TABLET ORAL
Qty: 90 TABLET | Refills: 3 | OUTPATIENT
Start: 2018-11-17

## 2018-11-17 NOTE — TELEPHONE ENCOUNTER
Future appt:    Last Appointment:  11/5/2018 (px)    Fenofibrate refilled yesterday (11/16/18) for one year  Request too soon- declined    Cholesterol, Total (mg/dL)   Date Value   11/02/2018 136     HDL Cholesterol (mg/dL)   Date Value   11/02/2018 49

## 2018-12-10 ENCOUNTER — TELEPHONE (OUTPATIENT)
Dept: FAMILY MEDICINE CLINIC | Facility: CLINIC | Age: 68
End: 2018-12-10

## 2018-12-10 NOTE — TELEPHONE ENCOUNTER
Pt was seen and had labs done on 11/5/18. At that time, pt states she discussed with CR possibility of trying Prednisone for trial anti-inflammatory-  Chronic L wrist pain.   Pt states she has gone to ortho, therapy, tried wearing a brace, states s/s are t

## 2018-12-11 RX ORDER — PREDNISONE 10 MG/1
TABLET ORAL
Qty: 20 TABLET | Refills: 0 | Status: SHIPPED | OUTPATIENT
Start: 2018-12-11 | End: 2018-12-26

## 2018-12-17 NOTE — TELEPHONE ENCOUNTER
Future appt:     Your appointments     Date & Time Appointment Department [facility])    Jan 04, 2019 11:30 AM CST Exam - Established Patient with Heaven Hendricks MD [address] [facility])        Christiano Brain

## 2019-01-04 ENCOUNTER — TELEPHONE (OUTPATIENT)
Dept: FAMILY MEDICINE CLINIC | Facility: CLINIC | Age: 69
End: 2019-01-04

## 2019-01-04 ENCOUNTER — OFFICE VISIT (OUTPATIENT)
Dept: FAMILY MEDICINE CLINIC | Facility: CLINIC | Age: 69
End: 2019-01-04
Payer: MEDICARE

## 2019-01-04 VITALS
SYSTOLIC BLOOD PRESSURE: 130 MMHG | RESPIRATION RATE: 18 BRPM | HEIGHT: 65.5 IN | HEART RATE: 74 BPM | TEMPERATURE: 98 F | OXYGEN SATURATION: 98 % | BODY MASS INDEX: 22.78 KG/M2 | WEIGHT: 138.38 LBS | DIASTOLIC BLOOD PRESSURE: 70 MMHG

## 2019-01-04 DIAGNOSIS — E11.9 CONTROLLED TYPE 2 DIABETES MELLITUS WITHOUT COMPLICATION, WITHOUT LONG-TERM CURRENT USE OF INSULIN (HCC): ICD-10-CM

## 2019-01-04 DIAGNOSIS — M77.8 TENDONITIS OF WRIST, LEFT: Primary | ICD-10-CM

## 2019-01-04 PROCEDURE — 99213 OFFICE O/P EST LOW 20 MIN: CPT | Performed by: FAMILY MEDICINE

## 2019-01-04 RX ORDER — BLOOD SUGAR DIAGNOSTIC
STRIP MISCELLANEOUS
Qty: 200 STRIP | Refills: 1 | Status: SHIPPED | OUTPATIENT
Start: 2019-01-04 | End: 2019-01-05

## 2019-01-04 RX ORDER — BLOOD-GLUCOSE METER
1 EACH MISCELLANEOUS 2 TIMES DAILY
Qty: 1 KIT | Refills: 0 | Status: SHIPPED | OUTPATIENT
Start: 2019-01-04 | End: 2019-01-05

## 2019-01-04 NOTE — TELEPHONE ENCOUNTER
[facility]  One Touch Ultra 2 monitor not covered. [facility] states that [employer] is covered. Pt testing BID. RX's started for CR review.

## 2019-01-04 NOTE — PROGRESS NOTES
[address]  PROGRESS NOTE  Chief Complaint:   Patient presents with: Follow - Up      HPI:   This is a 76year old female coming in for medica; f/u     Pt with improved tendonitis of her left wrist.  Patient pleased with results.     ree Alcohol/week: 0.0 oz        Comment: rare- one per month- wine/beer      Drug use: No    Other Topics      Concerns:    Family History:  Family History   Problem Relation Age of Onset   • Hypertension Mother    • Diabetes Paternal Grandmother      Allergie given: Not Answered       REVIEW OF SYSTEMS:   CONSTITUTIONAL:  Denies unusual weight gain/loss, fever, chills, or fatigue. EENT:  Eyes:  Denies eye pain, visual loss, blurred vision, double vision or yellow sclerae.  Ears, Nose, Throat:  Denies hearing lo rales/rhonchi/wheezing. Obed Tara EXTREMITIES:  No edema, no cyanosis, swelling of the left wrist normal range of motion strength of the thumb and forefinger just slightly less than what she has on the right side. PSYCH:  Normal mood and affect.  Behavior is fan

## 2019-01-04 NOTE — PATIENT INSTRUCTIONS
[id number] [address] Vanessa for order for new glucometer    Pt to continue home hand and wrist exercises  Monitor off steroids

## 2019-01-05 ENCOUNTER — TELEPHONE (OUTPATIENT)
Dept: FAMILY MEDICINE CLINIC | Facility: CLINIC | Age: 69
End: 2019-01-05

## 2019-01-05 RX ORDER — BLOOD SUGAR DIAGNOSTIC
2 STRIP MISCELLANEOUS DAILY
COMMUNITY
End: 2020-05-18

## 2019-01-05 RX ORDER — BLOOD-GLUCOSE METER
1 EACH MISCELLANEOUS 2 TIMES DAILY
Qty: 1 KIT | Refills: 0 | Status: SHIPPED | OUTPATIENT
Start: 2019-01-05 | End: 2019-01-05

## 2019-01-05 RX ORDER — BLOOD-GLUCOSE METER
1 EACH MISCELLANEOUS 2 TIMES DAILY
Qty: 1 KIT | Refills: 0 | Status: SHIPPED | OUTPATIENT
Start: 2019-01-05 | End: 2020-01-05

## 2019-01-05 RX ORDER — BLOOD-GLUCOSE METER
EACH MISCELLANEOUS
Qty: 1 KIT | Refills: 0 | Status: SHIPPED | OUTPATIENT
Start: 2019-01-05 | End: 2019-01-05

## 2019-01-05 RX ORDER — BLOOD SUGAR DIAGNOSTIC
STRIP MISCELLANEOUS
Qty: 200 STRIP | Refills: 1 | Status: SHIPPED | OUTPATIENT
Start: 2019-01-05 | End: 2019-11-18

## 2019-01-05 NOTE — TELEPHONE ENCOUNTER
needs rx for glucometer / testing supplies to state \"testing BID\" instead of \"use as directed\"- medicare rejected it due to the way instructions were stated- everything else on the rx is ok

## 2019-01-29 ENCOUNTER — PATIENT OUTREACH (OUTPATIENT)
Dept: FAMILY MEDICINE CLINIC | Facility: CLINIC | Age: 69
End: 2019-01-29

## 2019-03-06 ENCOUNTER — OFFICE VISIT (OUTPATIENT)
Dept: FAMILY MEDICINE CLINIC | Facility: CLINIC | Age: 69
End: 2019-03-06
Payer: MEDICARE

## 2019-03-06 VITALS
SYSTOLIC BLOOD PRESSURE: 130 MMHG | BODY MASS INDEX: 23.04 KG/M2 | OXYGEN SATURATION: 97 % | DIASTOLIC BLOOD PRESSURE: 70 MMHG | TEMPERATURE: 98 F | WEIGHT: 140 LBS | HEART RATE: 70 BPM | RESPIRATION RATE: 14 BRPM | HEIGHT: 65.5 IN

## 2019-03-06 DIAGNOSIS — M79.641 RIGHT HAND PAIN: Primary | ICD-10-CM

## 2019-03-06 DIAGNOSIS — M77.8 TENDONITIS OF WRIST, LEFT: ICD-10-CM

## 2019-03-06 PROCEDURE — 99213 OFFICE O/P EST LOW 20 MIN: CPT | Performed by: FAMILY MEDICINE

## 2019-03-06 NOTE — PROGRESS NOTES
Bianca Savage is a 76year old female. HPI:   Patient presents for recheck of her hand and wrist pain. . Pt had significant improvement in her left hand tendinitis and pain after a course of prednisone.   Patient feels that she still has fairly good by Finger stick route 2 (two) times daily. Non-insulinDx: E11. 4NJP#[id number] Disp: 1 Box Rfl: 1   METFORMIN HCL 1000 MG Oral Tab TAKE ONE TABLET (1,000MG TOTAL ) BY MOUTH TWICE A DAY WITH MEALS Disp: 180 tablet Rfl: 0   FENOFIBRATE 160 MG Oral Tab TAKE ON pain  GI: denies abdominal pain and denies heartburn  NEURO: denies headaches    EXAM:   /70 (BP Location: Left arm, Patient Position: Sitting, Cuff Size: adult)   Pulse 70   Temp 97.6 °F (36.4 °C) (Temporal)   Resp 14   Ht 65.5\"   Wt 140 lb   SpO2

## 2019-03-26 NOTE — TELEPHONE ENCOUNTER
Please advise refill of Metformin 1000mg. Last Rx: 12/17/18    Future appt:    Last Appointment:  3/6/2019 for acute issue, seen 1/4/19 for acute issue but DM was commented on.      Cholesterol, Total (mg/dL)   Date Value   11/02/2018 136     HDL Cholester

## 2019-06-04 NOTE — TELEPHONE ENCOUNTER
Future appt:    Last Appointment:  11/5/18 physical  Cholesterol, Total (mg/dL)   Date Value   11/02/2018 136     HDL Cholesterol (mg/dL)   Date Value   11/02/2018 49     LDL Cholesterol (mg/dL)   Date Value   11/02/2018 72     Triglycerides (mg/dL)   Date

## 2019-11-12 ENCOUNTER — APPOINTMENT (OUTPATIENT)
Dept: LAB | Age: 69
End: 2019-11-12
Attending: FAMILY MEDICINE
Payer: COMMERCIAL

## 2019-11-12 ENCOUNTER — OFFICE VISIT (OUTPATIENT)
Dept: FAMILY MEDICINE CLINIC | Facility: CLINIC | Age: 69
End: 2019-11-12
Payer: MEDICARE

## 2019-11-12 VITALS
DIASTOLIC BLOOD PRESSURE: 80 MMHG | HEART RATE: 78 BPM | RESPIRATION RATE: 16 BRPM | SYSTOLIC BLOOD PRESSURE: 136 MMHG | WEIGHT: 138 LBS | TEMPERATURE: 98 F | OXYGEN SATURATION: 98 % | HEIGHT: 65.5 IN | BODY MASS INDEX: 22.72 KG/M2

## 2019-11-12 DIAGNOSIS — K27.9 PUD (PEPTIC ULCER DISEASE): ICD-10-CM

## 2019-11-12 DIAGNOSIS — E11.9 CONTROLLED TYPE 2 DIABETES MELLITUS WITHOUT COMPLICATION, WITHOUT LONG-TERM CURRENT USE OF INSULIN (HCC): Primary | ICD-10-CM

## 2019-11-12 DIAGNOSIS — E55.9 VITAMIN D DEFICIENCY: ICD-10-CM

## 2019-11-12 DIAGNOSIS — Z13.31 DEPRESSION SCREENING: ICD-10-CM

## 2019-11-12 DIAGNOSIS — M77.8 RIGHT WRIST TENDONITIS: ICD-10-CM

## 2019-11-12 DIAGNOSIS — K58.2 IRRITABLE BOWEL SYNDROME WITH BOTH CONSTIPATION AND DIARRHEA: ICD-10-CM

## 2019-11-12 DIAGNOSIS — M77.8 TENDONITIS OF WRIST, LEFT: ICD-10-CM

## 2019-11-12 DIAGNOSIS — E78.49 FAMILIAL MULTIPLE LIPOPROTEIN-TYPE HYPERLIPIDEMIA: ICD-10-CM

## 2019-11-12 DIAGNOSIS — K21.00 GASTROESOPHAGEAL REFLUX DISEASE WITH ESOPHAGITIS: ICD-10-CM

## 2019-11-12 DIAGNOSIS — Z00.00 ENCOUNTER FOR ANNUAL HEALTH EXAMINATION: ICD-10-CM

## 2019-11-12 DIAGNOSIS — D12.2 ADENOMATOUS POLYP OF ASCENDING COLON: ICD-10-CM

## 2019-11-12 PROBLEM — H04.559 ACQUIRED STENOSIS OF NASOLACRIMAL DUCT: Status: ACTIVE | Noted: 2019-07-09

## 2019-11-12 PROBLEM — H25.9 AGE-RELATED CATARACT OF RIGHT EYE: Status: ACTIVE | Noted: 2019-07-09

## 2019-11-12 PROCEDURE — 84443 ASSAY THYROID STIM HORMONE: CPT | Performed by: FAMILY MEDICINE

## 2019-11-12 PROCEDURE — 82607 VITAMIN B-12: CPT | Performed by: FAMILY MEDICINE

## 2019-11-12 PROCEDURE — 82306 VITAMIN D 25 HYDROXY: CPT | Performed by: FAMILY MEDICINE

## 2019-11-12 PROCEDURE — 80053 COMPREHEN METABOLIC PANEL: CPT | Performed by: FAMILY MEDICINE

## 2019-11-12 PROCEDURE — G0439 PPPS, SUBSEQ VISIT: HCPCS | Performed by: FAMILY MEDICINE

## 2019-11-12 PROCEDURE — 83036 HEMOGLOBIN GLYCOSYLATED A1C: CPT | Performed by: FAMILY MEDICINE

## 2019-11-12 PROCEDURE — 85025 COMPLETE CBC W/AUTO DIFF WBC: CPT | Performed by: FAMILY MEDICINE

## 2019-11-12 PROCEDURE — 36415 COLL VENOUS BLD VENIPUNCTURE: CPT | Performed by: FAMILY MEDICINE

## 2019-11-12 PROCEDURE — 82570 ASSAY OF URINE CREATININE: CPT | Performed by: FAMILY MEDICINE

## 2019-11-12 PROCEDURE — 80061 LIPID PANEL: CPT | Performed by: FAMILY MEDICINE

## 2019-11-12 PROCEDURE — G0444 DEPRESSION SCREEN ANNUAL: HCPCS | Performed by: FAMILY MEDICINE

## 2019-11-12 PROCEDURE — 99212 OFFICE O/P EST SF 10 MIN: CPT | Performed by: FAMILY MEDICINE

## 2019-11-12 PROCEDURE — 82043 UR ALBUMIN QUANTITATIVE: CPT | Performed by: FAMILY MEDICINE

## 2019-11-12 RX ORDER — PREDNISONE 10 MG/1
TABLET ORAL
Qty: 20 TABLET | Refills: 0 | Status: SHIPPED | OUTPATIENT
Start: 2019-11-12 | End: 2019-11-29

## 2019-11-12 NOTE — PROGRESS NOTES
CC: Annual Physical Exam    HPI:   Roxanna Alva is a 71year old female who presents for a complete physical exam.      pt with wrist pain, osteoarthritisis   Right now issue, than left  Had in left hand ast ear-- treated steroids- got better, mild so Does not apply Kit 1 Device by Other route 2 (two) times daily.  Non-insulin  Dx: E11.9  [facility]#[id number] 1 kit 0   • Glucose Blood (ONETOUCH VERIO) In Vitro Strip Testing BID  Non-insulin  Dx: E11.9  [facility]#[id number] 200 strip 1   • ONETOUCH DELICA LANCETS FIN have you lost more than 10 pounds without trying?: 2 - No    Has your appetite been poor?: No    Type of Diet: Balanced    How does the patient maintain a good energy level?: Daily Walks;Stretching    How would you describe your daily physical activity?: M Cognitive Assessment     What day of the week is this?: Correct    What month is it?: Correct    What year is it?: Correct    Recall \"Ball\": Correct    Recall \"Flag\": Correct    Recall \"Tree\": Correct            REVIEW OF SYSTEMS:   Melisa Aguilar oriented, well developed, well nourished, no apparent distress.   HEENT:  Head:  Normocephalic, atraumatic   Eyes: EOMI, PERRLA, no scleral icterus, conjunctivae clear bilaterally, no eye discharge   Ears: TM's clear and intact bilaterally, no excess cerume Future  - CBC WITH DIFFERENTIAL WITH PLATELET  - COMP METABOLIC PANEL (14)  - HEMOGLOBIN A1C  - LIPID PANEL  - MICROALB/CREAT RATIO, RANDOM URINE  - TSH W REFLEX TO FREE T4  - URINALYSIS WITH CULTURE REFLEX  - CBC W/ DIFFERENTIAL    2.  Depression screening laboratory results  - VITAMIN D, 25-HYDROXY; Future  - VITAMIN D, 25-HYDROXY    7. Adenomatous polyp of ascending colon  Monitoring with colonoscopies patient up-to-date    8.  Irritable bowel syndrome with both constipation and diarrhea  No triggers identi PREVENTATIVE SERVICES  INDICATIONS AND SCHEDULE Internal Lab or Procedure External Lab or Procedure   Diabetes Screening      HbgA1C    At Least  Annually for Diabetics HgbA1C (%)   Date Value   11/02/2018 6.7 (H)    No flowsheet data found.     Fasting B Occult Blood   Covered Annually No results found for: FOB, OCCULTSTOOL No flowsheet data found.      Barium Enema-   uncomfortable but covered  Covered but uncomfortable   Glaucoma Screening      Ophthalmology Visit   Covered annually for Diabetics, people previous visit.  Medium/high risk factors:   End-stage renal disease   Hemophiliacs who received Factor VIII or IX concentrates   Clients of institutions for the mentally retarded   Persons who live in the same house as a HepB virus carrier   Homosexual men days.       Imaging & Consults:  None    The patient is asked to return pending review of lab works. This note was created utilizing Dragon speech recognition software.  Please excuse any grammatical errors.  Call my office if you have any questions hira

## 2019-11-12 NOTE — PATIENT INSTRUCTIONS
rec mammogram    rec fasting labs    Continue meds    rec prednisone taper for flare of right wrist arthritis/ tendonitis.     Further recommendations pending lab results            Phyllis Portillo's SCREENING SCHEDULE   Tests on this list are recommended have smoked more than 100 cigarettes in their lifetime   • Anyone with a family history    Colorectal Cancer Screening  Covered up to Age 76     Colonoscopy Screen   Covered every 10 years- more often if abnormal Colonoscopy due on 05/01/2023 Update Health visit. Please get every year    Pneumococcal 13 (Prevnar)  Covered Once after 65 No orders found for this or any previous visit.  Please get once after your 65th birthday    Pneumococcal 23 (Pneumovax)  Covered Once after 65 Orders placed or performed in vi

## 2019-11-13 ENCOUNTER — TELEPHONE (OUTPATIENT)
Dept: FAMILY MEDICINE CLINIC | Facility: CLINIC | Age: 69
End: 2019-11-13

## 2019-11-13 RX ORDER — PIOGLITAZONEHYDROCHLORIDE 15 MG/1
15 TABLET ORAL DAILY
Qty: 30 TABLET | Refills: 2 | Status: SHIPPED | OUTPATIENT
Start: 2019-11-13 | End: 2020-01-03

## 2019-11-13 RX ORDER — FENOFIBRATE 160 MG/1
160 TABLET ORAL NIGHTLY
Qty: 90 TABLET | Refills: 3 | Status: SHIPPED | OUTPATIENT
Start: 2019-11-13 | End: 2020-07-17

## 2019-11-13 NOTE — TELEPHONE ENCOUNTER
----- Message from Nuno Lieberman MD sent at 11/13/2019  8:05 AM CST -----  Lab results reviewed  HGBA1c elevated 7.4    chemistryr- stable  Lipids-normal  Thyroid-normal  Cbc-normal  Vit D- normal  Vit B12-normal  Urine negative  Improved diabetic con

## 2019-11-14 NOTE — TELEPHONE ENCOUNTER
Pt called back  Pt did review her my chart    Pt is open to adding another diabetic medication. Pt uses [facility]. Per CR- pt was informed that Actos 15mg would be added. Pt scheduled her f/u appt.     Pt also needs Metformin and Fenofibrate se

## 2019-11-16 RX ORDER — FENOFIBRATE 160 MG/1
TABLET ORAL
Qty: 90 TABLET | Refills: 0 | Status: SHIPPED | OUTPATIENT
Start: 2019-11-16 | End: 2020-06-03

## 2019-11-16 NOTE — TELEPHONE ENCOUNTER
Future appt:     Your appointments     Date & Time Appointment Department [facility])    Nov 18, 2019 11:00 AM CST SCREENING MAMMOGRAM with JOSE L WARNER [place] [facility] [address] [facility] ([place])    Please arrive 15 minutes prior to your appointment or underarms.  They leave a coating that may be picked up by the x-rays, thereby distorting the mammogram.    Wear a two piece outfit the day of the exam. This allows you to be more comfortable during the exam.      Dec 27, 2019 11:00 AM [facility] Exam - [facility]

## 2019-11-18 ENCOUNTER — HOSPITAL ENCOUNTER (OUTPATIENT)
Dept: MAMMOGRAPHY | Age: 69
Discharge: HOME | End: 2019-11-18
Attending: FAMILY MEDICINE
Payer: MEDICARE

## 2019-11-18 DIAGNOSIS — Z12.31 BREAST CANCER SCREENING BY MAMMOGRAM: ICD-10-CM

## 2019-11-18 PROCEDURE — 77063 BREAST TOMOSYNTHESIS BI: CPT | Performed by: FAMILY MEDICINE

## 2019-11-18 PROCEDURE — 77067 SCR MAMMO BI INCL CAD: CPT | Performed by: FAMILY MEDICINE

## 2019-11-18 RX ORDER — BLOOD SUGAR DIAGNOSTIC
STRIP MISCELLANEOUS
Qty: 200 STRIP | Refills: 1 | OUTPATIENT
Start: 2019-11-18 | End: 2019-12-04

## 2019-11-18 NOTE — TELEPHONE ENCOUNTER
Future appt:     Your appointments     Date & Time Appointment Department [facility])    Nov 18, 2019 11:00 AM CST SCREENING MAMMOGRAM with HEALTHANUEL WARNER [facility] RM [address] [facility] ([place])    Please arrive 15 minutes prior to your appointment or underarms.  They leave a coating that may be picked up by the x-rays, thereby distorting the mammogram.    Wear a two piece outfit the day of the exam. This allows you to be more comfortable during the exam.      Dec 27, 2019 11:00 AM CST Exam - Spiro Sermons

## 2019-11-18 NOTE — TELEPHONE ENCOUNTER
Future appt:     Your appointments     Date & Time Appointment Department [facility])    Dec 27, 2019 11:00 AM CST Exam - Established with Edith Castelan MD [address] [address]

## 2019-11-18 NOTE — TELEPHONE ENCOUNTER
Pt called back, states RX needs to be sent to [facility]. Pt is not out, states request OTW.

## 2019-11-19 RX ORDER — FENOFIBRATE 160 MG/1
TABLET ORAL
Qty: 90 TABLET | Refills: 3 | OUTPATIENT
Start: 2019-11-19

## 2019-11-22 ENCOUNTER — PATIENT OUTREACH (OUTPATIENT)
Dept: CASE MANAGEMENT | Age: 69
End: 2019-11-22

## 2019-11-25 ENCOUNTER — PATIENT OUTREACH (OUTPATIENT)
Dept: CASE MANAGEMENT | Age: 69
End: 2019-11-25

## 2019-11-25 NOTE — PROGRESS NOTES
Called pt introduced CCM program. Pt interested but prefers to think about it. CCM program overview sent via [employer] to pt and will follow up in a month.

## 2019-12-04 ENCOUNTER — OFFICE VISIT (OUTPATIENT)
Dept: FAMILY MEDICINE CLINIC | Facility: CLINIC | Age: 69
End: 2019-12-04
Payer: MEDICARE

## 2019-12-04 ENCOUNTER — TELEPHONE (OUTPATIENT)
Dept: FAMILY MEDICINE CLINIC | Facility: CLINIC | Age: 69
End: 2019-12-04

## 2019-12-04 VITALS
OXYGEN SATURATION: 98 % | WEIGHT: 132.81 LBS | RESPIRATION RATE: 16 BRPM | DIASTOLIC BLOOD PRESSURE: 78 MMHG | SYSTOLIC BLOOD PRESSURE: 122 MMHG | HEIGHT: 65.5 IN | BODY MASS INDEX: 21.86 KG/M2 | HEART RATE: 88 BPM | TEMPERATURE: 98 F

## 2019-12-04 DIAGNOSIS — K92.1 BLOOD IN STOOL: ICD-10-CM

## 2019-12-04 DIAGNOSIS — R19.5 WATERY STOOLS: Primary | ICD-10-CM

## 2019-12-04 PROCEDURE — 99214 OFFICE O/P EST MOD 30 MIN: CPT | Performed by: NURSE PRACTITIONER

## 2019-12-04 RX ORDER — BLOOD SUGAR DIAGNOSTIC
STRIP MISCELLANEOUS
Qty: 200 STRIP | Refills: 1 | Status: SHIPPED | OUTPATIENT
Start: 2019-12-04 | End: 2020-04-22

## 2019-12-04 NOTE — TELEPHONE ENCOUNTER
Pt c/o GI s/s and fever, started Sunday. Pt states not her usual IBS s/s. Pt states the had a 103.4 temp on Sunday with diarrhea and foul odor was noted, and cramping. Pt temp: down to 101 on Monday with similar GI s/s   Pt stay in bed and hydrated.

## 2019-12-04 NOTE — PROGRESS NOTES
HPI:    Patient ID: Johana Sinha is a 71year old female. Patient reports to the clinic today with complaints of loose stools and noticing blood in the stool this morning. Patient reports she has a history of IBS.   Reports symptoms of loose bowels TAKE ONE TABLET BY MOUTH ONCE DAILY 90 tablet 0   • Fenofibrate 160 MG Oral Tab Take 1 tablet (160 mg total) by mouth nightly. 90 tablet 3   • Pioglitazone HCl 15 MG Oral Tab Take 1 tablet (15 mg total) by mouth daily.  30 tablet 2   • Glucose Blood (ONETOU affect. Her behavior is normal.   Nursing note and vitals reviewed.              ASSESSMENT/PLAN:   Watery stools  (primary encounter diagnosis)  Blood in stool    Orders Placed This Encounter      CLOSTRIDIUM DIFFICILE CULTURE [[id number]]      WBC, Stool [E]

## 2019-12-04 NOTE — PATIENT INSTRUCTIONS
Stool culture order placed today     Continue to monitor signs and symptoms     Continue bland diet, incorporate heavier foods slowly     Emergent signs discussed    Will call with lab results     If no resolution, will refer back to GI     Do not share a Sepsis

## 2019-12-04 NOTE — TELEPHONE ENCOUNTER
Patient requested to be seen today by Dr Tim Felix, states she has blood on her stool. Informed patient that Dr Tim Felix is full for today and offered an appt with a different provider. Patient declined and requested a call back from nurse.

## 2019-12-05 ENCOUNTER — LAB ENCOUNTER (OUTPATIENT)
Dept: LAB | Age: 69
End: 2019-12-05
Attending: NURSE PRACTITIONER
Payer: MEDICARE

## 2019-12-05 DIAGNOSIS — K92.1 BLOOD IN STOOL: ICD-10-CM

## 2019-12-05 DIAGNOSIS — R19.5 WATERY STOOLS: Primary | ICD-10-CM

## 2019-12-05 PROCEDURE — 87046 STOOL CULTR AEROBIC BACT EA: CPT

## 2019-12-05 PROCEDURE — 87077 CULTURE AEROBIC IDENTIFY: CPT

## 2019-12-05 PROCEDURE — 87045 FECES CULTURE AEROBIC BACT: CPT

## 2019-12-05 PROCEDURE — 87493 C DIFF AMPLIFIED PROBE: CPT

## 2019-12-05 PROCEDURE — 89055 LEUKOCYTE ASSESSMENT FECAL: CPT

## 2019-12-06 ENCOUNTER — TELEPHONE (OUTPATIENT)
Dept: FAMILY MEDICINE CLINIC | Facility: CLINIC | Age: 69
End: 2019-12-06

## 2019-12-06 RX ORDER — METRONIDAZOLE 500 MG/1
500 TABLET ORAL 3 TIMES DAILY
Qty: 30 TABLET | Refills: 0 | Status: SHIPPED | OUTPATIENT
Start: 2019-12-06 | End: 2019-12-16

## 2019-12-06 NOTE — TELEPHONE ENCOUNTER
We are still waiting on one stool culture but cdiff did come back Positive. She needs to start  a course of metronidazole (flagyl). She has 2 local pharmacies on file. Which pharmacy would she like us to send it to?      Kyle Marie

## 2019-12-06 NOTE — TELEPHONE ENCOUNTER
Called patient to notify her of test results. Prescription sent to her preferred pharmacy. Will call with last culture result once we get it. Patient  Verbalized understanding.

## 2019-12-07 ENCOUNTER — TELEPHONE (OUTPATIENT)
Dept: FAMILY MEDICINE CLINIC | Facility: CLINIC | Age: 69
End: 2019-12-07

## 2019-12-07 NOTE — TELEPHONE ENCOUNTER
Patient notified of test results and Odolina's instructions. She states she has had 3 doses of Flagyl and already is feeling better.

## 2019-12-07 NOTE — TELEPHONE ENCOUNTER
----- Message from JACKLYN Samuels sent at 12/7/2019 11:20 AM CST -----  Results reviewed. Final stool culture came back, is positive for abnormal bacteria. She is already aware that she is CDIFF positive.  Flagyl regimen will cover her for this as wel

## 2019-12-26 ENCOUNTER — PATIENT OUTREACH (OUTPATIENT)
Dept: CASE MANAGEMENT | Age: 69
End: 2019-12-26

## 2019-12-26 NOTE — PROGRESS NOTES
Called pt to follow up on CCM info sent via [address] Pt stated she is waiting to discuss tomorrow with pcp and will decide from there.

## 2019-12-27 ENCOUNTER — OFFICE VISIT (OUTPATIENT)
Dept: FAMILY MEDICINE CLINIC | Facility: CLINIC | Age: 69
End: 2019-12-27
Payer: MEDICARE

## 2019-12-27 VITALS
HEART RATE: 76 BPM | HEIGHT: 65.5 IN | RESPIRATION RATE: 16 BRPM | OXYGEN SATURATION: 97 % | WEIGHT: 134.63 LBS | BODY MASS INDEX: 22.16 KG/M2 | DIASTOLIC BLOOD PRESSURE: 72 MMHG | TEMPERATURE: 97 F | SYSTOLIC BLOOD PRESSURE: 114 MMHG

## 2019-12-27 DIAGNOSIS — K52.9 COLITIS: ICD-10-CM

## 2019-12-27 DIAGNOSIS — M77.8 RIGHT WRIST TENDONITIS: ICD-10-CM

## 2019-12-27 DIAGNOSIS — E11.9 CONTROLLED TYPE 2 DIABETES MELLITUS WITHOUT COMPLICATION, WITHOUT LONG-TERM CURRENT USE OF INSULIN (HCC): Primary | ICD-10-CM

## 2019-12-27 PROCEDURE — 99214 OFFICE O/P EST MOD 30 MIN: CPT | Performed by: FAMILY MEDICINE

## 2019-12-27 NOTE — PROGRESS NOTES
[facility]  PRO[facility]ESS [facility]  Chief Complaint:   Patient presents with:  Diabetes: f/u      HPI:   This is a 71year old female recent c.diff infection. eval with health dept.  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 EYES:  Denies eye pain, visual loss, blurred vision, double vision or yellow sclerae.    INTEGUMENTARY:  Denies rashes, itching, skin lesion, or excess in all 4 quadrants, no Masses, no hepatosplenomegaly. SKIN: No rashes, no skin lesion, no bruising, good turgor. MUSCULOSKELETAL: Normal ROM, no joint pain, or muscle weakness in all extremity.    NEUROLOGICAL:  No deficit, normal gait, strength and tone, List:  Patient Active Problem List:     Diabetes mellitus type II, controlled ([facility] [address]     Familial multiple lipoprotein-type hyperlipidemia     Tendonitis of wrist, left     PUD (peptic ulcer disease)     Adenomatous polyp of ascending colon     Acquired kely

## 2019-12-27 NOTE — PATIENT INSTRUCTIONS
rec monitor diet and exercise    Trial increase ACTOS to 30 mg every morning    Report BS in 1-2 weeks    Continue to monitor wrist.    Recheck labs and fu 3 months

## 2020-01-02 ENCOUNTER — PATIENT OUTREACH (OUTPATIENT)
Dept: CASE MANAGEMENT | Age: 70
End: 2020-01-02

## 2020-01-03 ENCOUNTER — TELEPHONE (OUTPATIENT)
Dept: FAMILY MEDICINE CLINIC | Facility: CLINIC | Age: 70
End: 2020-01-03

## 2020-01-03 RX ORDER — PIOGLITAZONEHYDROCHLORIDE 30 MG/1
30 TABLET ORAL DAILY
Qty: 90 TABLET | Refills: 1 | Status: SHIPPED | OUTPATIENT
Start: 2020-01-03 | End: 2020-05-18

## 2020-01-03 NOTE — TELEPHONE ENCOUNTER
----- Message from [address] [place] [address]. Manolo Kashif sent at 1/3/2020 12:19 PM CST -----  Regarding: Visit Follow-up Question  Contact: [phone]  Here is my BG log.  I have 2 more days of Actos @60mg & a  single 15mg tab for Monday. Please advise how to continue.    Orion Vizcaino

## 2020-01-03 NOTE — TELEPHONE ENCOUNTER
Pt informed. Pt stated that she would report BS log again in one month. Asked [employer] to call with any questions.

## 2020-01-03 NOTE — TELEPHONE ENCOUNTER
Pt called back,  Sujey Francisco states she had increase in her Actos (since 12/27/19) now taking 2 tabs (of 15mg tab) daily and has a total of 5 tablets left. Pt will need new updated RX if she is continue to [employer].   Pt BS log dated 12/28 until present reflect

## 2020-03-20 ENCOUNTER — PATIENT MESSAGE (OUTPATIENT)
Dept: FAMILY MEDICINE CLINIC | Facility: CLINIC | Age: 70
End: 2020-03-20

## 2020-03-20 DIAGNOSIS — M25.552 LEFT HIP PAIN: Primary | ICD-10-CM

## 2020-03-20 NOTE — TELEPHONE ENCOUNTER
From: Anuj Pozo  To: Jhonatan Boyd MD  Sent: 3/20/2020 7:08 AM CDT  Subject: Referral Request    After several weeks of hip pain, icing, stretching & chiropractor visits, I saw Ghanshyam Salazar therapist yesterday.  He has sent a fax to you to au

## 2020-03-20 NOTE — TELEPHONE ENCOUNTER
I will authorize PT in this unique time of coronavirus. Patient is Strongly advised to have a more throurough eval and xrays if does not improve with Physical therapy in the next 2-3 weeks.

## 2020-03-20 NOTE — TELEPHONE ENCOUNTER
Patient should cancel her appointment for March 27 and rescheduled for in May after blood work done. I would advise that she reach out to Dr. Bob Verde in regards to her wrist pain.   I do not want to prescribe steroids if he has an different treatment plan

## 2020-03-20 NOTE — TELEPHONE ENCOUNTER
Pt informed. Pt has appt scheduled on 3/27  Pt last seen 12/27/19    1. Pt states she did not have her labs done.   Pt states she was supposed to come in to discuss her blood sugards after her Actos increase in Dec.  Pt states her BS range in AM:  97-1

## 2020-03-20 NOTE — TELEPHONE ENCOUNTER
Pt informed.       Future Appointments   Date Time Provider Nayeli Marissa   5/14/2020  9:15 AM [facility]   5/18/2020 11:00 AM Yossi Estes MD [facility]

## 2020-04-22 RX ORDER — BLOOD SUGAR DIAGNOSTIC
STRIP MISCELLANEOUS
Qty: 200 STRIP | Refills: 1 | Status: SHIPPED | OUTPATIENT
Start: 2020-04-22 | End: 2020-05-18

## 2020-04-22 NOTE — TELEPHONE ENCOUNTER
Future appt:     Your appointments     Date & Time Appointment Department [facility])    May 18, 2020 11:00 AM CDT Follow Up Visit with Oleg Frank MD [address] [facility] ([facility])            Thi Macias

## 2020-05-12 ENCOUNTER — PATIENT MESSAGE (OUTPATIENT)
Dept: FAMILY MEDICINE CLINIC | Facility: CLINIC | Age: 70
End: 2020-05-12

## 2020-05-13 ENCOUNTER — PATIENT MESSAGE (OUTPATIENT)
Dept: FAMILY MEDICINE CLINIC | Facility: CLINIC | Age: 70
End: 2020-05-13

## 2020-05-13 NOTE — TELEPHONE ENCOUNTER
From: Maris Moon  To: Richard Echevarria MD  Sent: 5/12/2020 4:04 PM CDT  Subject: Visit Follow-up Question    Nicki, I will drop off Blood Glucose sheets tomorrow/Wednesday, 5/13. I am scheduled for appointment 5/18.  My labs were cancelled by Marisela Almeida

## 2020-05-13 NOTE — TELEPHONE ENCOUNTER
Pt states she is scheduled formed f/u on MOnday 5/18. Pt asked if she should still come to office. Pt states her lab appt was canceled 3 wks. Pt states she is not sure what to do about labs. Pt informed not to drop anything off to office.   Pt asked

## 2020-05-13 NOTE — TELEPHONE ENCOUNTER
It is recommended that she have a video visit ( or phone if preferred) at this time.   Alternative is to reschedule in [place]

## 2020-05-13 NOTE — TELEPHONE ENCOUNTER
From: Parvez Mosqueda  To: Ericka Randolph MD  Sent: 5/13/2020 5:09 PM CDT  Subject: Other    Exceeded limit. One more page in next message.

## 2020-05-14 NOTE — TELEPHONE ENCOUNTER
From: Caroline Brunson  To: Maria Del Rosario Arteaga MD  Sent: 5/13/2020 5:10 PM CDT  Subject: Other    April

## 2020-05-14 NOTE — TELEPHONE ENCOUNTER
Message sent to pt via [email or web address]. Thank you for sharing glucose logs. They look good. Please let me know if you have any concerns.     Future Appointments   Date Time Provider Nayeli Ann   5/18/2020 11:00 AM Dawna Osuna MD [facility]

## 2020-05-18 ENCOUNTER — TELEMEDICINE (OUTPATIENT)
Dept: FAMILY MEDICINE CLINIC | Facility: CLINIC | Age: 70
End: 2020-05-18
Payer: MEDICARE

## 2020-05-18 DIAGNOSIS — K58.2 IRRITABLE BOWEL SYNDROME WITH BOTH CONSTIPATION AND DIARRHEA: ICD-10-CM

## 2020-05-18 DIAGNOSIS — E11.9 CONTROLLED TYPE 2 DIABETES MELLITUS WITHOUT COMPLICATION, WITHOUT LONG-TERM CURRENT USE OF INSULIN (HCC): Primary | ICD-10-CM

## 2020-05-18 DIAGNOSIS — E78.49 FAMILIAL MULTIPLE LIPOPROTEIN-TYPE HYPERLIPIDEMIA: ICD-10-CM

## 2020-05-18 DIAGNOSIS — M77.8 RIGHT WRIST TENDONITIS: ICD-10-CM

## 2020-05-18 PROCEDURE — 99214 OFFICE O/P EST MOD 30 MIN: CPT | Performed by: FAMILY MEDICINE

## 2020-05-18 RX ORDER — PIOGLITAZONEHYDROCHLORIDE 30 MG/1
30 TABLET ORAL DAILY
Qty: 90 TABLET | Refills: 1 | Status: SHIPPED | OUTPATIENT
Start: 2020-05-18 | End: 2020-06-16

## 2020-05-18 RX ORDER — BLOOD SUGAR DIAGNOSTIC
STRIP MISCELLANEOUS
Qty: 200 STRIP | Refills: 1 | Status: SHIPPED | OUTPATIENT
Start: 2020-05-18 | End: 2020-05-22

## 2020-05-18 NOTE — PROGRESS NOTES
Jean Valentine is a 71year old female. Patient presents with: Follow - Up: Diabetes medication f/u      Jean Valentine  verbally consents to a Virtual/Telephone Check-In service on 5/18/2020.     Patient understands and accepts financial responsib Current Outpatient Medications   Medication Sig Dispense Refill   • Pioglitazone HCl 30 MG Oral Tab Take 1 tablet (30 mg total) by mouth daily.  90 tablet 1   • Glucose Blood (ONETOUCH VERIO) In Vitro Strip Testing BID  Non-insulin  Dx: E11.9  NPI#[id number] heartburn  NEURO: denies headaches    EXAM:   There were no vitals taken for this visit. - video visit  Wt 139 pt reported  GENERAL: converses easily; in no apparent distress  SKIN: no rashes,no suspicious lesions  HEENT: atraumatic, normocephalic,ears and relationship, due to the ongoing public health crisis/national emergency and because of restrictions of visitation. There are limitations of this visit. Every conscious effort was taken to allow for sufficient and adequate time.   This time was  also spen

## 2020-05-18 NOTE — PATIENT INSTRUCTIONS
Patient to continue present medications. Patient refills sent to mail order pharmacy per her request.  Patient recommended to consider fasting laboratory studies before any wrist surgery and/or in 3 months which ever comes sooner.   Patient to contact

## 2020-05-19 ENCOUNTER — TELEPHONE (OUTPATIENT)
Dept: FAMILY MEDICINE CLINIC | Facility: CLINIC | Age: 70
End: 2020-05-19

## 2020-05-19 NOTE — TELEPHONE ENCOUNTER
RX for test strips sent to [facility] mail order on 5/18/20 as pt requested. Pt states she will call [facility] pharmacy- local pharamcy. Pt states she has enough test strips on hand at this time.

## 2020-05-21 ENCOUNTER — PATIENT MESSAGE (OUTPATIENT)
Dept: FAMILY MEDICINE CLINIC | Facility: CLINIC | Age: 70
End: 2020-05-21

## 2020-05-21 DIAGNOSIS — E11.9 CONTROLLED TYPE 2 DIABETES MELLITUS WITHOUT COMPLICATION, WITHOUT LONG-TERM CURRENT USE OF INSULIN (HCC): Primary | ICD-10-CM

## 2020-05-22 ENCOUNTER — PATIENT MESSAGE (OUTPATIENT)
Dept: FAMILY MEDICINE CLINIC | Facility: CLINIC | Age: 70
End: 2020-05-22

## 2020-05-22 RX ORDER — BLOOD SUGAR DIAGNOSTIC
STRIP MISCELLANEOUS
Qty: 200 STRIP | Refills: 1 | Status: SHIPPED | OUTPATIENT
Start: 2020-05-22 | End: 2020-05-27

## 2020-05-22 NOTE — TELEPHONE ENCOUNTER
From: Cuate Harrison  To: John Howard MD  Sent: 5/21/2020 9:50 PM CDT  Subject: Prescription Question    Nicki, follow up on your call re: [facility]. Please send Verio test strip Rx to [facility] even if you did it before.  They will not fill it until t

## 2020-05-22 NOTE — TELEPHONE ENCOUNTER
Message noted. Routed to [employer]- working in office today. RX will need to be printed and faxed due to Medicare.

## 2020-05-22 NOTE — TELEPHONE ENCOUNTER
From: Terry Hoffman  To: Dexter Coley MD  Sent: 5/22/2020 7:45 AM CDT  Subject: Prescription Question    Nicki, [facility] [place] [facility]. I am getting an MRI today at 0930, so will be unavailable from about 0920 to 1030 or so if you need to speak with me.

## 2020-05-27 ENCOUNTER — TELEPHONE (OUTPATIENT)
Dept: FAMILY MEDICINE CLINIC | Facility: CLINIC | Age: 70
End: 2020-05-27

## 2020-05-27 DIAGNOSIS — E11.9 CONTROLLED TYPE 2 DIABETES MELLITUS WITHOUT COMPLICATION, WITHOUT LONG-TERM CURRENT USE OF INSULIN (HCC): ICD-10-CM

## 2020-05-27 RX ORDER — BLOOD SUGAR DIAGNOSTIC
STRIP MISCELLANEOUS
Qty: 100 STRIP | Refills: 1 | Status: SHIPPED | OUTPATIENT
Start: 2020-05-27 | End: 2020-07-17

## 2020-05-27 NOTE — TELEPHONE ENCOUNTER
Fax from [facility] re: testing strips. RX for One Touch approved on 5/22. Called [facility] pharmacy. Per pharmacy- per Medicare guidelines,   Medicare will only cover pt to test once daily since pt is non-insulin    Pharmacy will need new RX.   RX adjusted for P

## 2020-06-03 ENCOUNTER — OFFICE VISIT (OUTPATIENT)
Dept: FAMILY MEDICINE CLINIC | Facility: CLINIC | Age: 70
End: 2020-06-03
Payer: MEDICARE

## 2020-06-03 ENCOUNTER — APPOINTMENT (OUTPATIENT)
Dept: LAB | Age: 70
End: 2020-06-03
Attending: FAMILY MEDICINE
Payer: MEDICARE

## 2020-06-03 VITALS
TEMPERATURE: 98 F | DIASTOLIC BLOOD PRESSURE: 70 MMHG | WEIGHT: 139.19 LBS | BODY MASS INDEX: 22.37 KG/M2 | RESPIRATION RATE: 16 BRPM | SYSTOLIC BLOOD PRESSURE: 138 MMHG | HEIGHT: 66 IN | HEART RATE: 68 BPM

## 2020-06-03 DIAGNOSIS — E78.49 FAMILIAL MULTIPLE LIPOPROTEIN-TYPE HYPERLIPIDEMIA: ICD-10-CM

## 2020-06-03 DIAGNOSIS — M77.8 RIGHT WRIST TENDONITIS: ICD-10-CM

## 2020-06-03 DIAGNOSIS — E11.9 CONTROLLED TYPE 2 DIABETES MELLITUS WITHOUT COMPLICATION, WITHOUT LONG-TERM CURRENT USE OF INSULIN (HCC): ICD-10-CM

## 2020-06-03 DIAGNOSIS — Z01.818 PREOP EXAMINATION: Primary | ICD-10-CM

## 2020-06-03 PROCEDURE — 36415 COLL VENOUS BLD VENIPUNCTURE: CPT | Performed by: FAMILY MEDICINE

## 2020-06-03 PROCEDURE — 83036 HEMOGLOBIN GLYCOSYLATED A1C: CPT | Performed by: FAMILY MEDICINE

## 2020-06-03 PROCEDURE — 80061 LIPID PANEL: CPT | Performed by: FAMILY MEDICINE

## 2020-06-03 PROCEDURE — 84443 ASSAY THYROID STIM HORMONE: CPT | Performed by: FAMILY MEDICINE

## 2020-06-03 PROCEDURE — 99215 OFFICE O/P EST HI 40 MIN: CPT | Performed by: FAMILY MEDICINE

## 2020-06-03 PROCEDURE — 85025 COMPLETE CBC W/AUTO DIFF WBC: CPT | Performed by: FAMILY MEDICINE

## 2020-06-03 PROCEDURE — 83735 ASSAY OF MAGNESIUM: CPT | Performed by: FAMILY MEDICINE

## 2020-06-03 PROCEDURE — 81003 URINALYSIS AUTO W/O SCOPE: CPT | Performed by: FAMILY MEDICINE

## 2020-06-03 PROCEDURE — 93000 ELECTROCARDIOGRAM COMPLETE: CPT | Performed by: FAMILY MEDICINE

## 2020-06-03 PROCEDURE — 80053 COMPREHEN METABOLIC PANEL: CPT | Performed by: FAMILY MEDICINE

## 2020-06-03 RX ORDER — CALCIUM CARBONATE/VITAMIN D3 600 MG-10
1 TABLET ORAL DAILY
COMMUNITY

## 2020-06-03 NOTE — PROGRESS NOTES
[facility]  PROGRESS NOTE  Chief Complaint:   Patient presents with:  Pre-Op: surgery Dr. Jacoby Bartholomew- 6/12-  Right Wrist extensor tenosynovectomy      HPI:   This is a 71year old female coming in for preop eval     continued pain right wrist Social History:  Social History    Socioeconomic History      Marital status:       Spouse name: Not on file      Number of children: Not on file      Years of education: Not on file      Highest education level: Not on file    Tobacco Use pressure, chest discomfort, palpitations, edema, dyspnea on exertion or at rest.  RESPIRATORY:  Denies shortness of breath, wheezing, cough or sputum. GASTROINTESTINAL:  Denies abdominal pain, nausea, vomiting, constipation, diarrhea, or blood in stool. masses, no hepatosplenomegaly. BACK: No tenderness, no spasm, SLR test negative, FROM. EXTREMITIES:  No edema, no cyanosis, no clubbing, FROM, 2+ dorsalis pedis pulses bilaterally. --Right wrist dorsal surface with soft tissue swelling and tenderness fei REFLEX  - CBC W/ DIFFERENTIAL    4. Familial multiple lipoprotein-type hyperlipidemia  Patient fasting and due for follow-up lipids we will check today.       Problem List:  Patient Active Problem List:     Diabetes mellitus type II, controlled ([facility] [address]     Fa

## 2020-06-03 NOTE — PATIENT INSTRUCTIONS
preop labs today    EKG stable    Encourage walking and exercise with diet monitoring    Plan for right wrist surgery

## 2020-06-04 NOTE — PROGRESS NOTES
Preoperative in general health labs reviewed. Patient with improvement in her hemoglobin A1c to 6.4. Chemistry panel normal.  Lipid panel normal.  Magnesium normal.  Thyroid function normal.  Urinalysis negative.   CBC normal.  Laboratory results reassuri

## 2020-06-15 ENCOUNTER — PATIENT MESSAGE (OUTPATIENT)
Dept: FAMILY MEDICINE CLINIC | Facility: CLINIC | Age: 70
End: 2020-06-15

## 2020-06-15 NOTE — TELEPHONE ENCOUNTER
From: Bianca Savage  To: Sugey Sevilla MD  Sent: 6/15/2020 9:40 AM CDT  Subject: Prescription Question    Nicki, you have been helping me to get all my Rx to mail order.  [facility] not [facility] pantoprazole 40 mg or pioglitazone HCL 30mg

## 2020-06-16 RX ORDER — PANTOPRAZOLE SODIUM 40 MG/1
40 TABLET, DELAYED RELEASE ORAL DAILY
Qty: 90 TABLET | Refills: 0 | Status: SHIPPED | OUTPATIENT
Start: 2020-06-16 | End: 2020-07-20

## 2020-06-16 RX ORDER — PIOGLITAZONEHYDROCHLORIDE 30 MG/1
30 TABLET ORAL DAILY
Qty: 90 TABLET | Refills: 1 | Status: SHIPPED | OUTPATIENT
Start: 2020-06-16 | End: 2020-11-17

## 2020-07-17 ENCOUNTER — PATIENT MESSAGE (OUTPATIENT)
Dept: FAMILY MEDICINE CLINIC | Facility: CLINIC | Age: 70
End: 2020-07-17

## 2020-07-17 DIAGNOSIS — E11.9 CONTROLLED TYPE 2 DIABETES MELLITUS WITHOUT COMPLICATION, WITHOUT LONG-TERM CURRENT USE OF INSULIN (HCC): ICD-10-CM

## 2020-07-17 RX ORDER — BLOOD SUGAR DIAGNOSTIC
STRIP MISCELLANEOUS
Qty: 100 STRIP | Refills: 1 | Status: SHIPPED | OUTPATIENT
Start: 2020-07-17 | End: 2020-07-29

## 2020-07-17 RX ORDER — FENOFIBRATE 160 MG/1
TABLET ORAL
Qty: 90 TABLET | Refills: 3 | Status: SHIPPED | OUTPATIENT
Start: 2020-07-17 | End: 2020-11-20

## 2020-07-17 NOTE — TELEPHONE ENCOUNTER
Future appt:    Last Appointment with provider:   6/3/2020; No f/u recommended    Last appointment at [facility]:  6/3/2020  Cholesterol, Total (mg/dL)   Date Value   06/03/2020 162     HDL Cholesterol (mg/dL)   Date Value   06/03/2020 49     LDL Cholest

## 2020-07-17 NOTE — TELEPHONE ENCOUNTER
Looks like scripts for test strips were sent to [facility] on 5/27/2020. Patient would like script to mail order. Script pended. Please advise.

## 2020-07-17 NOTE — TELEPHONE ENCOUNTER
From: Ankit Hodge  To: Anthony Easley MD  Sent: 7/17/2020 10:02 AM CDT  Subject: Prescription Question    Nicki please send a request to [employer] [facility] mail order for test strips—One Touch Verio.  I feel sure we took care of this months ago, but they don’t h

## 2020-07-20 RX ORDER — PANTOPRAZOLE SODIUM 40 MG/1
40 TABLET, DELAYED RELEASE ORAL DAILY
Qty: 90 TABLET | Refills: 1 | Status: SHIPPED | OUTPATIENT
Start: 2020-07-20 | End: 2020-11-24

## 2020-07-28 ENCOUNTER — PATIENT MESSAGE (OUTPATIENT)
Dept: FAMILY MEDICINE CLINIC | Facility: CLINIC | Age: 70
End: 2020-07-28

## 2020-07-28 NOTE — TELEPHONE ENCOUNTER
From: Ramonita Briones  To: Rom Alfonso MD  Sent: 7/28/2020 8:44 AM CDT  Subject: Prescription Question    So can’t refill my Verio test strips. Received the attached in mail yesterday. Please advise.

## 2020-07-29 ENCOUNTER — PATIENT MESSAGE (OUTPATIENT)
Dept: FAMILY MEDICINE CLINIC | Facility: CLINIC | Age: 70
End: 2020-07-29

## 2020-07-29 DIAGNOSIS — E11.9 CONTROLLED TYPE 2 DIABETES MELLITUS WITHOUT COMPLICATION, WITHOUT LONG-TERM CURRENT USE OF INSULIN (HCC): ICD-10-CM

## 2020-07-29 RX ORDER — BLOOD SUGAR DIAGNOSTIC
STRIP MISCELLANEOUS
Qty: 100 STRIP | Refills: 1 | Status: SHIPPED | OUTPATIENT
Start: 2020-07-29 | End: 2021-02-19

## 2020-07-29 NOTE — TELEPHONE ENCOUNTER
From: Maris Moon  To: Richard Echevarria MD  Sent: 7/29/2020 4:08 PM CDT  Subject: Prescription Question    Please send a new Rx to [facility] [place] for Verio test strips. Must include ICD 9 code & instructions to test 1 x Day. Thank you.  Cannot b

## 2020-08-05 ENCOUNTER — PATIENT MESSAGE (OUTPATIENT)
Dept: FAMILY MEDICINE CLINIC | Facility: CLINIC | Age: 70
End: 2020-08-05

## 2020-08-05 DIAGNOSIS — E11.9 CONTROLLED TYPE 2 DIABETES MELLITUS WITHOUT COMPLICATION, WITHOUT LONG-TERM CURRENT USE OF INSULIN (HCC): ICD-10-CM

## 2020-08-06 NOTE — TELEPHONE ENCOUNTER
From: Misael Medeiros  To: Catalina Mack MD  Sent: 8/5/2020 9:27 PM CDT  Subject: Prescription Question    I am OUT of Verio test strips. Please sent Rx to [facility]. The mail order does not provide test strips to Medicare patients.

## 2020-10-15 ENCOUNTER — TELEPHONE (OUTPATIENT)
Dept: FAMILY MEDICINE CLINIC | Facility: CLINIC | Age: 70
End: 2020-10-15

## 2020-10-15 DIAGNOSIS — E11.9 CONTROLLED TYPE 2 DIABETES MELLITUS WITHOUT COMPLICATION, WITHOUT LONG-TERM CURRENT USE OF INSULIN (HCC): Primary | ICD-10-CM

## 2020-11-10 ENCOUNTER — LABORATORY ENCOUNTER (OUTPATIENT)
Dept: LAB | Age: 70
End: 2020-11-10
Attending: FAMILY MEDICINE
Payer: MEDICARE

## 2020-11-10 DIAGNOSIS — E11.9 CONTROLLED TYPE 2 DIABETES MELLITUS WITHOUT COMPLICATION, WITHOUT LONG-TERM CURRENT USE OF INSULIN (HCC): ICD-10-CM

## 2020-11-10 PROCEDURE — 83036 HEMOGLOBIN GLYCOSYLATED A1C: CPT

## 2020-11-10 PROCEDURE — 81003 URINALYSIS AUTO W/O SCOPE: CPT

## 2020-11-10 PROCEDURE — 80061 LIPID PANEL: CPT

## 2020-11-10 PROCEDURE — 82570 ASSAY OF URINE CREATININE: CPT

## 2020-11-10 PROCEDURE — 80053 COMPREHEN METABOLIC PANEL: CPT

## 2020-11-10 PROCEDURE — 82043 UR ALBUMIN QUANTITATIVE: CPT

## 2020-11-10 PROCEDURE — 84443 ASSAY THYROID STIM HORMONE: CPT

## 2020-11-10 PROCEDURE — 85025 COMPLETE CBC W/AUTO DIFF WBC: CPT

## 2020-11-10 PROCEDURE — 36415 COLL VENOUS BLD VENIPUNCTURE: CPT

## 2020-11-17 RX ORDER — PIOGLITAZONEHYDROCHLORIDE 30 MG/1
TABLET ORAL
Qty: 90 TABLET | Refills: 1 | Status: SHIPPED | OUTPATIENT
Start: 2020-11-17 | End: 2021-04-20

## 2020-11-17 NOTE — TELEPHONE ENCOUNTER
Future appt:     Your appointments     Date & Time Appointment Department [facility])    Nov 24, 2020  2:30 PM CST Medicare Annual Well Visit with Tracy Haines MD [address], [place] ([facility])            Ed

## 2020-11-18 ENCOUNTER — PATIENT MESSAGE (OUTPATIENT)
Dept: FAMILY MEDICINE CLINIC | Facility: CLINIC | Age: 70
End: 2020-11-18

## 2020-11-18 NOTE — TELEPHONE ENCOUNTER
From: Linda Mckeon  To: Gini Loya MD  Sent: 11/18/2020 10:13 AM CST  Subject: Non-Urgent Medical Question    BG for Sep, Oct. NOV. File will not attach, says it is too big. Tried multiple times. I've done it before.  I just bring with me Tues

## 2020-11-19 ENCOUNTER — PATIENT MESSAGE (OUTPATIENT)
Dept: FAMILY MEDICINE CLINIC | Facility: CLINIC | Age: 70
End: 2020-11-19

## 2020-11-19 NOTE — TELEPHONE ENCOUNTER
From: Parvez Mosqueda  To: Ericka Randolph MD  Sent: 11/19/2020 5:58 AM CST  Subject: Prescription Question    If you want me to continue on Fenofibrate I will need a new Rx sent to mail order. I will be out before Tuesday, my appointment. Thanks.

## 2020-11-19 NOTE — TELEPHONE ENCOUNTER
Future Appointments   Date Time Provider Nayeli Ann   11/24/2020  2:30 PM Octavia Che MD [facility] for review at upcoming appointment

## 2020-11-20 RX ORDER — FENOFIBRATE 160 MG/1
160 TABLET ORAL NIGHTLY
Qty: 90 TABLET | Refills: 0 | Status: SHIPPED | OUTPATIENT
Start: 2020-11-20 | End: 2021-02-12

## 2020-11-24 ENCOUNTER — OFFICE VISIT (OUTPATIENT)
Dept: FAMILY MEDICINE CLINIC | Facility: CLINIC | Age: 70
End: 2020-11-24
Payer: MEDICARE

## 2020-11-24 VITALS
DIASTOLIC BLOOD PRESSURE: 70 MMHG | SYSTOLIC BLOOD PRESSURE: 110 MMHG | WEIGHT: 138 LBS | TEMPERATURE: 98 F | RESPIRATION RATE: 16 BRPM | OXYGEN SATURATION: 98 % | HEIGHT: 66 IN | BODY MASS INDEX: 22.18 KG/M2 | HEART RATE: 78 BPM

## 2020-11-24 DIAGNOSIS — E78.49 FAMILIAL MULTIPLE LIPOPROTEIN-TYPE HYPERLIPIDEMIA: ICD-10-CM

## 2020-11-24 DIAGNOSIS — K58.2 IRRITABLE BOWEL SYNDROME WITH BOTH CONSTIPATION AND DIARRHEA: ICD-10-CM

## 2020-11-24 DIAGNOSIS — M77.8 RIGHT WRIST TENDONITIS: ICD-10-CM

## 2020-11-24 DIAGNOSIS — E11.9 CONTROLLED TYPE 2 DIABETES MELLITUS WITHOUT COMPLICATION, WITHOUT LONG-TERM CURRENT USE OF INSULIN (HCC): Primary | ICD-10-CM

## 2020-11-24 DIAGNOSIS — K27.9 PUD (PEPTIC ULCER DISEASE): ICD-10-CM

## 2020-11-24 DIAGNOSIS — Z13.31 DEPRESSION SCREENING: ICD-10-CM

## 2020-11-24 DIAGNOSIS — Z00.00 ENCOUNTER FOR ANNUAL HEALTH EXAMINATION: ICD-10-CM

## 2020-11-24 PROCEDURE — G0439 PPPS, SUBSEQ VISIT: HCPCS | Performed by: FAMILY MEDICINE

## 2020-11-24 PROCEDURE — G0444 DEPRESSION SCREEN ANNUAL: HCPCS | Performed by: FAMILY MEDICINE

## 2020-11-24 RX ORDER — PANTOPRAZOLE SODIUM 40 MG/1
40 TABLET, DELAYED RELEASE ORAL DAILY
Qty: 90 TABLET | Refills: 1 | Status: SHIPPED | OUTPATIENT
Start: 2020-11-24 | End: 2021-07-26

## 2020-11-24 NOTE — PROGRESS NOTES
CC: Annual Physical Exam    HPI:   Caroline Brunson is a 79year old female who presents for a complete physical exam.     Patient worked for the [employer] this summer, patient looking into options for other part-time work.     Pt active , feeling well 2. 0    FASTING Yes    HEMOGLOBIN A1C   Result Value Ref Range    HgbA1C 6.9 (H) <5.7 %    Estimated Average Glucose 151 (H) 68 - 126 mg/dL   LIPID PANEL   Result Value Ref Range    Cholesterol, Total 140 <200 mg/dL    HDL Cholesterol 59 40 - 59 mg/dL    Tr Monocyte % 10.6 %    Eosinophil % 2.1 %    Basophil % 1.0 %    Immature Granulocyte % 0.3 %       Current Outpatient Medications   Medication Sig Dispense Refill   • Pantoprazole Sodium 40 MG Oral Tab EC Take 1 tablet (40 mg total) by mouth daily.  90 table watches calories closely and low carb diet       General Health     In the past six months, have you lost more than 10 pounds without trying?: 2 - No    Has your appetite been poor?: No    Type of Diet: Balanced    How does the patient maintain a good ener 11/24/2020  2:30 PM  Entry User: Mary Costa MA  Screening Method: Finger Rub  Finger Rub Result: Pass         Visual Acuity                   Cognitive Assessment     What day of the week is this?: Correct    What month is it?: Correct    What year age, well groomed. Physical Exam:  GEN:  Patient is alert, awake and oriented, well developed, well nourished, no apparent distress.   HEENT:  Head:  Normocephalic, atraumatic   Eyes: EOMI, PERRLA, no scleral icterus, conjunctivae clear bilaterally, no eye Familial multiple lipoprotein-type hyperlipidemia  Clinically stable again continue to monitor diet    5. PUD (peptic ulcer disease)  Asymptomatic at present with medications   6.  Irritable bowel syndrome with both constipation and diarrhea  Patient with o impaired FBS or GTT   … or any two of the following:   • Overweight (BMI ³25 but <30)   • Family history of diabetes   • Age 72 years or older   • History of gestational diabetes or birth of baby weighing more than 9 pounds     Covered at least every 3 yea to schedule if you are in this risk group, make sure you have a referral   Bone Density Screening      Bone density screening   Covered every 2 yrs after age 72    Covered yearly for Long term Glucocorticoid medication (Steroids) Requires diagnosis related or any previous visit. This may be covered with your prescription benefits, but Medicare does not cover unless Medically needed    Zoster (Not covered by Medicare Part B) No orders found for this or any previous visit.  This may be covered with your pharmac

## 2020-11-24 NOTE — PATIENT INSTRUCTIONS
rec mammogram    Consider shingle vaccine 2021 at pharmacy    rec eye exam 2021    Continue present medications    Continue healthy diet and exercise/ activity    Recheck labs and f,u 6 months      Jon Portillo's SCREENING SCHEDULE   Tests on this lis 73-68 years old and have smoked more than 100 cigarettes in their lifetime   • Anyone with a family history    Colorectal Cancer Screening  Covered up to Age 76     Colonoscopy Screen   Covered every 10 years- more often if abnormal Colonoscopy due on 05/0 or any previous visit. Please get every year    Pneumococcal 13 (Prevnar)  Covered Once after 65 No orders found for this or any previous visit.  Please get once after your 65th birthday    Pneumococcal 23 (Pneumovax)  Covered Once after 65 Orders placed or

## 2020-12-08 NOTE — TELEPHONE ENCOUNTER
Future appt:    Last Appointment with provider:   11/24/2020  Last appointment at [facility]:  11/24/2020-Px    metFORMIN HCl 1000 MG Oral Tab-Sig:   TAKE ONE TABLET BY MOUTH TWICE A DAY WITH MEALS    Last refilled on 12/04/2019-#180 with 3 refills.    Ch

## 2021-01-21 ENCOUNTER — PATIENT MESSAGE (OUTPATIENT)
Dept: FAMILY MEDICINE CLINIC | Facility: CLINIC | Age: 71
End: 2021-01-21

## 2021-01-21 NOTE — TELEPHONE ENCOUNTER
From: Terry Hoffman  To: Dexter Coley MD  Sent: 1/21/2021 1:48 PM CST  Subject: Non-Urgent Medical Question    Will you have Covid vaccine available?  I’m signed up at the health department, but I want to get it where ever it is available first. T

## 2021-02-11 ENCOUNTER — PATIENT MESSAGE (OUTPATIENT)
Dept: FAMILY MEDICINE CLINIC | Facility: CLINIC | Age: 71
End: 2021-02-11

## 2021-02-12 RX ORDER — FENOFIBRATE 160 MG/1
160 TABLET ORAL NIGHTLY
Qty: 90 TABLET | Refills: 1 | Status: SHIPPED | OUTPATIENT
Start: 2021-02-12 | End: 2021-08-18

## 2021-02-12 NOTE — TELEPHONE ENCOUNTER
Future appt:    Last Appointment with provider:   11/24/2020  Last appointment at [facility]:  11/24/2020- [address]- pt was advised to f/u in 6 months    Pt my chart message noted.   Fenofibrate last refilled on 11/20/20 for #90    Refill request entered for re

## 2021-02-12 NOTE — TELEPHONE ENCOUNTER
From: Tameka Brunson  To: Etta Daigle MD  Sent: 2/11/2021 5:06 PM CST  Subject: Prescription Question    Please send my Fenofibrate 160mg , 90 days to [facility]. ( [facility] mail order price is $100 more! ) I have 11 more days supply.  Thank

## 2021-02-19 DIAGNOSIS — E11.9 CONTROLLED TYPE 2 DIABETES MELLITUS WITHOUT COMPLICATION, WITHOUT LONG-TERM CURRENT USE OF INSULIN (HCC): ICD-10-CM

## 2021-02-19 RX ORDER — BLOOD SUGAR DIAGNOSTIC
STRIP MISCELLANEOUS
Qty: 100 STRIP | Refills: 1 | Status: SHIPPED | OUTPATIENT
Start: 2021-02-19 | End: 2021-09-20

## 2021-02-19 NOTE — TELEPHONE ENCOUNTER
Future appt:    Last Appointment with provider:   11/24/2020; Recheck labs and f,u 6 months    Last appointment at [facility]:  11/24/2020  Cholesterol, Total (mg/dL)   Date Value   11/10/2020 140     HDL Cholesterol (mg/dL)   Date Value   11/10/2020 59

## 2021-03-01 NOTE — TELEPHONE ENCOUNTER
Future appt:    Last Appointment with provider:   6/3/2020  Last appointment at [facility]:  6/3/2020  Last px: 11/12/19    Pantoprazole refilled on 6/16/20 for 90 day supply        Cholesterol, Total (mg/dL)   Date Value   06/03/2020 162     HDL Cholest Oscar, Rand Parks

## 2021-04-15 ENCOUNTER — PATIENT MESSAGE (OUTPATIENT)
Dept: FAMILY MEDICINE CLINIC | Facility: CLINIC | Age: 71
End: 2021-04-15

## 2021-04-16 ENCOUNTER — PATIENT MESSAGE (OUTPATIENT)
Dept: FAMILY MEDICINE CLINIC | Facility: CLINIC | Age: 71
End: 2021-04-16

## 2021-04-16 NOTE — TELEPHONE ENCOUNTER
Dr. Collier Course, Ashkan Sams    Pt called back, states her friend recommended Dr. Collier Course- pt states she heard he had done wonders to help her friend who suffered with IBS just as Jon Diaz is.     Jon Diaz wondered if CR would provide

## 2021-04-16 NOTE — TELEPHONE ENCOUNTER
reviewd--Pt requesting treatment for worsening GI problem. Appt with her gastroenterologist or I would be recommended.

## 2021-04-16 NOTE — TELEPHONE ENCOUNTER
Pt informed.   Pt decided to schedule appt with CR on Tuesday    Future Appointments   Date Time Provider Nayeli Ann   4/20/2021 11:15 AM Keith Palma MD [facility]

## 2021-04-16 NOTE — TELEPHONE ENCOUNTER
From: Renard Lainez  To: Jovani Washburn MD  Sent: 4/16/2021 9:44 AM CDT  Subject: Non-Urgent Medical Question    BTW: Kulwinder Petit MD referred my friend.

## 2021-04-16 NOTE — TELEPHONE ENCOUNTER
Future Appointments   Date Time Provider Nayeli Ann   4/20/2021 11:15 AM Pasquale Schlatter, MD [facility]     Will discuss further at upcoming appt.

## 2021-04-16 NOTE — TELEPHONE ENCOUNTER
Pt states she will not go back to Dr. Jada Christine. Pt states Dr. Conrad Aj did her scope in 2018. Pt states her friend has seen Dr. Chaitanya Zhang and has had good results. Pt will call her friend for contact information, pt will call back.

## 2021-04-16 NOTE — TELEPHONE ENCOUNTER
From: Cuate Harrison  To: John Howard MD  Sent: 4/15/2021 9:11 AM CDT  Subject: Visit Follow-up Question    When I last saw you, my IBS was occasional and manageable. Now it has been bad daily impacting ADLs!  Could this be related to my gall blad

## 2021-04-16 NOTE — TELEPHONE ENCOUNTER
From: Diana Nix  To: Jorge Barnard MD  Sent: 4/16/2021 9:38 AM CDT  Subject: Non-Urgent Medical Question    Ashkan Major

## 2021-04-16 NOTE — TELEPHONE ENCOUNTER
Dr. Bang Mcdermott is primary care physician. Pt may seek care with him as desired. If she would like Gi referral- in town- DR. Sung Suarez.     Pt may make appt to discuus further with me if desired,

## 2021-04-20 ENCOUNTER — OFFICE VISIT (OUTPATIENT)
Dept: FAMILY MEDICINE CLINIC | Facility: CLINIC | Age: 71
End: 2021-04-20
Payer: MEDICARE

## 2021-04-20 VITALS
BODY MASS INDEX: 23.36 KG/M2 | TEMPERATURE: 97 F | OXYGEN SATURATION: 99 % | RESPIRATION RATE: 16 BRPM | WEIGHT: 145.38 LBS | DIASTOLIC BLOOD PRESSURE: 68 MMHG | HEART RATE: 70 BPM | HEIGHT: 66 IN | SYSTOLIC BLOOD PRESSURE: 112 MMHG

## 2021-04-20 DIAGNOSIS — E11.9 CONTROLLED TYPE 2 DIABETES MELLITUS WITHOUT COMPLICATION, WITHOUT LONG-TERM CURRENT USE OF INSULIN (HCC): ICD-10-CM

## 2021-04-20 DIAGNOSIS — K58.2 IRRITABLE BOWEL SYNDROME WITH BOTH CONSTIPATION AND DIARRHEA: Primary | ICD-10-CM

## 2021-04-20 PROCEDURE — 99214 OFFICE O/P EST MOD 30 MIN: CPT | Performed by: FAMILY MEDICINE

## 2021-04-20 RX ORDER — CHOLESTYRAMINE LIGHT 4 G/5.7G
4 POWDER, FOR SUSPENSION ORAL DAILY
Qty: 60 PACKET | Refills: 0 | Status: SHIPPED | OUTPATIENT
Start: 2021-04-20 | End: 2021-08-18

## 2021-04-20 RX ORDER — PIOGLITAZONEHYDROCHLORIDE 30 MG/1
30 TABLET ORAL DAILY
Qty: 90 TABLET | Refills: 1 | Status: SHIPPED | OUTPATIENT
Start: 2021-04-20 | End: 2021-05-03

## 2021-04-20 NOTE — PATIENT INSTRUCTIONS
Diet and Lifestyle Tips for Irritable Bowel Syndrome (IBS)    Your healthcare professional may suggest some lifestyle changes to help control your IBS. Changing your diet and managing stress are 2 of the most important.  Follow your healthcare provider’s It also recommends muscle-strengthening activities 2 days a week. If this sounds like a lot of time, the CDC suggests breaking physical activity into 10-minute blocks and spreading it out over a week.  Developing a schedule that works for you is the key to

## 2021-04-20 NOTE — PROGRESS NOTES
Mary Duran is a 79year old female.    Patient presents with:  Abdominal Pain: Having issues      HPI:   Patient presents for recheck of her  IBS    Flare up over last 10 days  Pt with no known trigger  Pt noting diarrhea 3-6 episodes a day-- mostly liver    • Hypertriglyceridemia    • Right wrist tendonitis 11/12/2019      Past Surgical History:   Procedure Laterality Date   • COLONOSCOPY  2013, 2018     Family History   Problem Relation Age of Onset   • Hypertension Mother    • Diabetes Paternal Gra Urine Negative Negative mg/dl    Urobilinogen Urine <2.0 0.2 - 2.0 mg/dL    Nitrite Urine Negative Negative    Leukocyte Esterase Urine Negative Negative    Microscopic Microscopic not indicated    MICROALB/CREAT RATIO, RANDOM URINE   Result Value Ref Rang murmur  GI: good BS's,no masses, HSM or tenderness  EXTREMITIES: no cyanosis, clubbing or edema  PSYCH: moods good, normal focus and interactions    ASSESSMENT AND PLAN:   Pt presents for a recheck of her . 1.  Irritable bowel syndrome with both constipat symptoms worse. · Eat more fiber if constipation is a problem. Fiber makes the stool softer and easier to pass through the colon. · Eat more fiber if diarrhea is a problem. Fiber also helps to bind water. This can help to firm up loose stool.   Reduce str

## 2021-04-25 NOTE — TELEPHONE ENCOUNTER
Pt notified and verbalized understanding.     Future Appointments   Date Time Provider Nayeli Marissa   11/10/2020  8:15 AM [facility]   11/24/2020  2:30 PM Paige Balderas MD [facility] [place]
Yes- orders placed
patient would like to know if she's going to need labs prior to her appointment? Please let patient know.   Future Appointments   Date Time Provider Nayeli Ann   11/24/2020  2:30 PM Daniel Felder MD [facility]
Principal Discharge DX:	Impulse control disorder  Secondary Diagnosis:	Schizoaffective disorder

## 2021-04-26 ENCOUNTER — PATIENT MESSAGE (OUTPATIENT)
Dept: FAMILY MEDICINE CLINIC | Facility: CLINIC | Age: 71
End: 2021-04-26

## 2021-04-26 NOTE — TELEPHONE ENCOUNTER
From: Deneen Sargent  To: Jaylan Melendrez MD  Sent: 4/26/2021 10:26 AM CDT  Subject: Visit Follow-up Question    Could my ibs be a side effect of taking Pantoprazole 40 mg for so long?    If so, would I take another acid reducer or just suffer the con

## 2021-04-26 NOTE — TELEPHONE ENCOUNTER
I do not recommend change of pantoprazole at this time  Continue diet changes and probiotic  Trial of questran if not better by the end of the week

## 2021-05-03 RX ORDER — PIOGLITAZONEHYDROCHLORIDE 30 MG/1
TABLET ORAL
Qty: 90 TABLET | Refills: 1 | Status: SHIPPED | OUTPATIENT
Start: 2021-05-03 | End: 2021-11-30

## 2021-05-03 NOTE — TELEPHONE ENCOUNTER
See MyChart message from patient. Patient would like script to [facility], [facility]. Please advise.

## 2021-05-03 NOTE — TELEPHONE ENCOUNTER
RFs given 4/20/2021 #90 with 1 RF to [facility]. [employer] message sent to patient asking her if she would like script to [facility].

## 2021-05-04 RX ORDER — PIOGLITAZONEHYDROCHLORIDE 30 MG/1
TABLET ORAL
Qty: 90 TABLET | Refills: 1 | OUTPATIENT
Start: 2021-05-04

## 2021-05-04 NOTE — TELEPHONE ENCOUNTER
Future appt:    Last Appointment with provider:   4/20/2021  Last appointment at [facility]:  4/20/2021  Last px: 11/24/20    Pioglitazone refilled on 5/3/21 for #90, 1 refill  Request approved yesterday. Request today too soon, declined.     Cholesterol

## 2021-07-26 RX ORDER — PANTOPRAZOLE SODIUM 40 MG/1
TABLET, DELAYED RELEASE ORAL
Qty: 90 TABLET | Refills: 0 | Status: SHIPPED | OUTPATIENT
Start: 2021-07-26 | End: 2021-10-12

## 2021-07-26 NOTE — TELEPHONE ENCOUNTER
Future appt:    Last Appointment with provider:   4/20/2021  Last appointment at [facility]:  4/20/2021  Last px: 11/24/20 -  Recheck on labs and f/u due 6 months    Pantoprazole refilled 11/24/20 for #90, 1 refill    Pt contacted.   Pt was in [place]- heading

## 2021-08-11 ENCOUNTER — TELEPHONE (OUTPATIENT)
Dept: FAMILY MEDICINE CLINIC | Facility: CLINIC | Age: 71
End: 2021-08-11

## 2021-08-11 DIAGNOSIS — E11.9 CONTROLLED TYPE 2 DIABETES MELLITUS WITHOUT COMPLICATION, WITHOUT LONG-TERM CURRENT USE OF INSULIN (HCC): Primary | ICD-10-CM

## 2021-08-11 DIAGNOSIS — E55.9 VITAMIN D DEFICIENCY: ICD-10-CM

## 2021-08-11 NOTE — TELEPHONE ENCOUNTER
----- Message from Kath Gayle sent at 8/11/2021  4:02 PM CDT -----  Regarding: lab orders needed   Patient has lab appointment  tomorrow, Could you please put lab orders in system.         Thanks,  Lora

## 2021-08-11 NOTE — TELEPHONE ENCOUNTER
Future Appointments   Date Time Provider Nayeli Marissa   8/12/2021  9:15 AM [facility]   8/18/2021  2:15 PM Carlos Zendejas MD [facility] [place]

## 2021-08-12 ENCOUNTER — LABORATORY ENCOUNTER (OUTPATIENT)
Dept: LAB | Age: 71
End: 2021-08-12
Attending: FAMILY MEDICINE
Payer: MEDICARE

## 2021-08-12 DIAGNOSIS — E11.9 CONTROLLED TYPE 2 DIABETES MELLITUS WITHOUT COMPLICATION, WITHOUT LONG-TERM CURRENT USE OF INSULIN (HCC): ICD-10-CM

## 2021-08-12 DIAGNOSIS — E55.9 VITAMIN D DEFICIENCY: ICD-10-CM

## 2021-08-12 LAB
ALBUMIN SERPL-MCNC: 4.1 G/DL (ref 3.4–5)
ALBUMIN/GLOB SERPL: 1.1 {RATIO} (ref 1–2)
ALP LIVER SERPL-CCNC: 42 U/L
ALT SERPL-CCNC: 30 U/L
ANION GAP SERPL CALC-SCNC: 5 MMOL/L (ref 0–18)
AST SERPL-CCNC: 32 U/L (ref 15–37)
BASOPHILS # BLD AUTO: 0.06 X10(3) UL (ref 0–0.2)
BASOPHILS NFR BLD AUTO: 1.1 %
BILIRUB SERPL-MCNC: 0.4 MG/DL (ref 0.1–2)
BILIRUB UR QL STRIP.AUTO: NEGATIVE
BUN BLD-MCNC: 16 MG/DL (ref 7–18)
CALCIUM BLD-MCNC: 8.9 MG/DL (ref 8.5–10.1)
CHLORIDE SERPL-SCNC: 107 MMOL/L (ref 98–112)
CHOLEST SMN-MCNC: 172 MG/DL (ref ?–200)
CLARITY UR REFRACT.AUTO: CLEAR
CO2 SERPL-SCNC: 27 MMOL/L (ref 21–32)
COLOR UR AUTO: YELLOW
CREAT BLD-MCNC: 0.84 MG/DL
CREAT UR-SCNC: 18.1 MG/DL
EOSINOPHIL # BLD AUTO: 0.12 X10(3) UL (ref 0–0.7)
EOSINOPHIL NFR BLD AUTO: 2.3 %
ERYTHROCYTE [DISTWIDTH] IN BLOOD BY AUTOMATED COUNT: 15.9 %
EST. AVERAGE GLUCOSE BLD GHB EST-MCNC: 148 MG/DL (ref 68–126)
GLOBULIN PLAS-MCNC: 3.9 G/DL (ref 2.8–4.4)
GLUCOSE BLD-MCNC: 93 MG/DL (ref 70–99)
GLUCOSE UR STRIP.AUTO-MCNC: NEGATIVE MG/DL
HBA1C MFR BLD HPLC: 6.8 % (ref ?–5.7)
HCT VFR BLD AUTO: 42.5 %
HDLC SERPL-MCNC: 51 MG/DL (ref 40–59)
HGB BLD-MCNC: 13.8 G/DL
IMM GRANULOCYTES # BLD AUTO: 0.01 X10(3) UL (ref 0–1)
IMM GRANULOCYTES NFR BLD: 0.2 %
KETONES UR STRIP.AUTO-MCNC: NEGATIVE MG/DL
LDLC SERPL CALC-MCNC: 107 MG/DL (ref ?–100)
LEUKOCYTE ESTERASE UR QL STRIP.AUTO: NEGATIVE
LYMPHOCYTES # BLD AUTO: 1.74 X10(3) UL (ref 1–4)
LYMPHOCYTES NFR BLD AUTO: 33 %
M PROTEIN MFR SERPL ELPH: 8 G/DL (ref 6.4–8.2)
MCH RBC QN AUTO: 28.7 PG (ref 26–34)
MCHC RBC AUTO-ENTMCNC: 32.5 G/DL (ref 31–37)
MCV RBC AUTO: 88.4 FL
MICROALBUMIN UR-MCNC: <0.5 MG/DL
MONOCYTES # BLD AUTO: 0.53 X10(3) UL (ref 0.1–1)
MONOCYTES NFR BLD AUTO: 10 %
NEUTROPHILS # BLD AUTO: 2.82 X10 (3) UL (ref 1.5–7.7)
NEUTROPHILS # BLD AUTO: 2.82 X10(3) UL (ref 1.5–7.7)
NEUTROPHILS NFR BLD AUTO: 53.4 %
NITRITE UR QL STRIP.AUTO: NEGATIVE
NONHDLC SERPL-MCNC: 121 MG/DL (ref ?–130)
OSMOLALITY SERPL CALC.SUM OF ELEC: 289 MOSM/KG (ref 275–295)
PATIENT FASTING Y/N/NP: YES
PATIENT FASTING Y/N/NP: YES
PH UR STRIP.AUTO: 7 [PH] (ref 5–8)
PLATELET # BLD AUTO: 340 10(3)UL (ref 150–450)
POTASSIUM SERPL-SCNC: 4.2 MMOL/L (ref 3.5–5.1)
PROT UR STRIP.AUTO-MCNC: NEGATIVE MG/DL
RBC # BLD AUTO: 4.81 X10(6)UL
RBC UR QL AUTO: NEGATIVE
SODIUM SERPL-SCNC: 139 MMOL/L (ref 136–145)
SP GR UR STRIP.AUTO: 1 (ref 1–1.03)
TRIGL SERPL-MCNC: 74 MG/DL (ref 30–149)
TSI SER-ACNC: 2.01 MIU/ML (ref 0.36–3.74)
UROBILINOGEN UR STRIP.AUTO-MCNC: <2 MG/DL
VIT D+METAB SERPL-MCNC: 33.2 NG/ML (ref 30–100)
VLDLC SERPL CALC-MCNC: 12 MG/DL (ref 0–30)
WBC # BLD AUTO: 5.3 X10(3) UL (ref 4–11)

## 2021-08-12 PROCEDURE — 85025 COMPLETE CBC W/AUTO DIFF WBC: CPT

## 2021-08-12 PROCEDURE — 80053 COMPREHEN METABOLIC PANEL: CPT

## 2021-08-12 PROCEDURE — 81003 URINALYSIS AUTO W/O SCOPE: CPT

## 2021-08-12 PROCEDURE — 83036 HEMOGLOBIN GLYCOSYLATED A1C: CPT

## 2021-08-12 PROCEDURE — 82306 VITAMIN D 25 HYDROXY: CPT

## 2021-08-12 PROCEDURE — 82043 UR ALBUMIN QUANTITATIVE: CPT

## 2021-08-12 PROCEDURE — 36415 COLL VENOUS BLD VENIPUNCTURE: CPT

## 2021-08-12 PROCEDURE — 84443 ASSAY THYROID STIM HORMONE: CPT

## 2021-08-12 PROCEDURE — 82570 ASSAY OF URINE CREATININE: CPT

## 2021-08-12 PROCEDURE — 80061 LIPID PANEL: CPT

## 2021-08-18 ENCOUNTER — OFFICE VISIT (OUTPATIENT)
Dept: FAMILY MEDICINE CLINIC | Facility: CLINIC | Age: 71
End: 2021-08-18
Payer: MEDICARE

## 2021-08-18 VITALS
TEMPERATURE: 98 F | HEIGHT: 66 IN | SYSTOLIC BLOOD PRESSURE: 114 MMHG | BODY MASS INDEX: 21.96 KG/M2 | WEIGHT: 136.63 LBS | RESPIRATION RATE: 16 BRPM | DIASTOLIC BLOOD PRESSURE: 68 MMHG | HEART RATE: 80 BPM | OXYGEN SATURATION: 96 %

## 2021-08-18 DIAGNOSIS — E11.9 CONTROLLED TYPE 2 DIABETES MELLITUS WITHOUT COMPLICATION, WITHOUT LONG-TERM CURRENT USE OF INSULIN (HCC): Primary | ICD-10-CM

## 2021-08-18 DIAGNOSIS — E78.49 FAMILIAL MULTIPLE LIPOPROTEIN-TYPE HYPERLIPIDEMIA: ICD-10-CM

## 2021-08-18 PROCEDURE — 99213 OFFICE O/P EST LOW 20 MIN: CPT | Performed by: FAMILY MEDICINE

## 2021-08-18 RX ORDER — CHOLESTYRAMINE LIGHT 4 G/5.7G
2 POWDER, FOR SUSPENSION ORAL DAILY
Qty: 90 PACKET | Refills: 3 | Status: SHIPPED | OUTPATIENT
Start: 2021-08-18

## 2021-08-18 RX ORDER — FENOFIBRATE 160 MG/1
160 TABLET ORAL NIGHTLY
Qty: 90 TABLET | Refills: 3 | Status: SHIPPED | OUTPATIENT
Start: 2021-08-18

## 2021-08-18 NOTE — PATIENT INSTRUCTIONS
rec vit D 1000 units a day in addition to calcium    Encourage healthy diet , good hydrations    Continue other medications    Recheck November/ December for annual check and labs

## 2021-08-18 NOTE — PROGRESS NOTES
[facility] [place]  PROGRESS NOTE  Chief Complaint:   Patient presents with:  Medication Follow-Up      HPI:   This is a 70year old female here for medical f.u    Laboratory results reviewed. .  Urinalysis negative. Urine microalbumin normal.  Pa Bilirubin, Total 0.4 0.1 - 2.0 mg/dL    Total Protein 8.0 6.4 - 8.2 g/dL    Albumin 4.1 3.4 - 5.0 g/dL    Globulin  3.9 2.8 - 4.4 g/dL    A/G Ratio 1.1 1.0 - 2.0    FASTING Yes    HEMOGLOBIN A1C   Result Value Ref Range    HgbA1C 6.8 (H) <5.7 %    Estimate Eosinophil Absolute 0.12 0.00 - 0.70 x10(3) uL    Basophil Absolute 0.06 0.00 - 0.20 x10(3) uL    Immature Granulocyte Absolute 0.01 0.00 - 1.00 x10(3) uL    Neutrophil % 53.4 %    Lymphocyte % 33.0 %    Monocyte % 10.0 %    Eosinophil % 2.3 %    Basophil (RA FLAX SEED OIL 1000) 1000 MG Oral Cap Take 1 capsule by mouth daily. Counseling given: Not Answered         REVIEW OF SYSTEMS:   CONSTITUTIONAL:  Denies unusual weight gain/loss, fever, chills, or fatigue.    EYES:  Denies eye pain, visual loss, no cyanosis, no clubbing, FROM, 2+ dorsalis pedis pulses bilaterally. ABDOMEN: Soft, nondistended, nontender, bowel sounds normal in all 4 quadrants, no Masses, no hepatosplenomegaly. SKIN: No rashes, no skin lesion, no bruising, good turgor.   Yadira Quick Problem List:  Patient Active Problem List:     Diabetes mellitus type II, controlled ([facility] [address]     Familial multiple lipoprotein-type hyperlipidemia     Tendonitis of wrist, left     PUD (peptic ulcer disease)     Adenomatous polyp of ascending colon

## 2021-09-20 DIAGNOSIS — E11.9 CONTROLLED TYPE 2 DIABETES MELLITUS WITHOUT COMPLICATION, WITHOUT LONG-TERM CURRENT USE OF INSULIN (HCC): ICD-10-CM

## 2021-09-20 RX ORDER — BLOOD SUGAR DIAGNOSTIC
STRIP MISCELLANEOUS
Qty: 100 STRIP | Refills: 1 | Status: SHIPPED | OUTPATIENT
Start: 2021-09-20

## 2021-09-20 NOTE — TELEPHONE ENCOUNTER
Future appt:     Your appointments     Date & Time Appointment Department [facility])    Nov 19, 2021  8:15 AM CST Laboratory Visit with NOLBERTO Parker Reference Lab ([facility] Ref Lab [facility])        Nov 30, 2021 11:00 AM CST Medicare Annual Well Visit with CHADWICK

## 2021-10-12 RX ORDER — PANTOPRAZOLE SODIUM 40 MG/1
TABLET, DELAYED RELEASE ORAL
Qty: 90 TABLET | Refills: 0 | Status: SHIPPED | OUTPATIENT
Start: 2021-10-12 | End: 2021-11-30

## 2021-10-12 NOTE — TELEPHONE ENCOUNTER
Future appt:     Your appointments     Date & Time Appointment Department [facility])    Nov 19, 2021  8:15 AM CST Laboratory Visit with REF Tisha Dey [facility] Lab ([facility] [place])        Nov 30, 2021 11:00 AM CST Medicare Annual Well Visit with CHADWICK

## 2021-11-14 ENCOUNTER — PATIENT MESSAGE (OUTPATIENT)
Dept: FAMILY MEDICINE CLINIC | Facility: CLINIC | Age: 71
End: 2021-11-14

## 2021-11-15 NOTE — TELEPHONE ENCOUNTER
From: Klarissa Moreau  To: Jeremy Denson MD  Sent: 11/14/2021 6:14 AM CST  Subject: Stress fracture? I think I may have a stress fracture of first L metatarsal. Who do you recommend is see? I assume a podiatrist, but I don’t have one. Thanks.

## 2021-11-15 NOTE — TELEPHONE ENCOUNTER
Pt states she started to have pain on left foot high on her arch- on side of great toe. No injury, no fall reported. Pt states her foot feels better after she starts to walk. Pt requested referral recommendation.     Pt informed she should schedule appt

## 2021-11-16 ENCOUNTER — PATIENT MESSAGE (OUTPATIENT)
Dept: FAMILY MEDICINE CLINIC | Facility: CLINIC | Age: 71
End: 2021-11-16

## 2021-11-16 NOTE — TELEPHONE ENCOUNTER
From: Jean Valentine  To: Yris Carbajal MD  Sent: 11/16/2021 8:02 AM CST  Subject: Angelica Lamar, since Saturday when my foot was worse and I sent message, I now believe it is soft tissue injury since it gets better when I’m on it a while.  Does haley

## 2021-11-16 NOTE — TELEPHONE ENCOUNTER
Pt sent my chart message on 11/14/21. Spoke with pt- pt clarified her foot pain is located to the top of her foot - mid section, not the bottom. Pt states s/s better when walking. Pt denies injury. Pt states she will use common sense and monitor .   E

## 2021-11-19 ENCOUNTER — LAB ENCOUNTER (OUTPATIENT)
Dept: LAB | Age: 71
End: 2021-11-19
Attending: FAMILY MEDICINE
Payer: MEDICARE

## 2021-11-19 DIAGNOSIS — E11.9 CONTROLLED TYPE 2 DIABETES MELLITUS WITHOUT COMPLICATION, WITHOUT LONG-TERM CURRENT USE OF INSULIN (HCC): ICD-10-CM

## 2021-11-19 DIAGNOSIS — E78.49 FAMILIAL MULTIPLE LIPOPROTEIN-TYPE HYPERLIPIDEMIA: ICD-10-CM

## 2021-11-19 PROCEDURE — 36415 COLL VENOUS BLD VENIPUNCTURE: CPT

## 2021-11-19 PROCEDURE — 80061 LIPID PANEL: CPT

## 2021-11-19 PROCEDURE — 83036 HEMOGLOBIN GLYCOSYLATED A1C: CPT

## 2021-11-19 PROCEDURE — 80053 COMPREHEN METABOLIC PANEL: CPT

## 2021-11-30 ENCOUNTER — OFFICE VISIT (OUTPATIENT)
Dept: FAMILY MEDICINE CLINIC | Facility: CLINIC | Age: 71
End: 2021-11-30
Payer: MEDICARE

## 2021-11-30 ENCOUNTER — HOSPITAL ENCOUNTER (OUTPATIENT)
Dept: BONE DENSITY | Age: 71
Discharge: HOME | End: 2021-11-30
Attending: FAMILY MEDICINE
Payer: MEDICARE

## 2021-11-30 VITALS
HEART RATE: 73 BPM | WEIGHT: 141 LBS | HEIGHT: 65.25 IN | RESPIRATION RATE: 18 BRPM | DIASTOLIC BLOOD PRESSURE: 78 MMHG | TEMPERATURE: 98 F | BODY MASS INDEX: 23.21 KG/M2 | OXYGEN SATURATION: 98 % | SYSTOLIC BLOOD PRESSURE: 122 MMHG

## 2021-11-30 DIAGNOSIS — K27.9 PUD (PEPTIC ULCER DISEASE): ICD-10-CM

## 2021-11-30 DIAGNOSIS — E11.9 CONTROLLED TYPE 2 DIABETES MELLITUS WITHOUT COMPLICATION, WITHOUT LONG-TERM CURRENT USE OF INSULIN (HCC): ICD-10-CM

## 2021-11-30 DIAGNOSIS — M77.8 TENDONITIS OF WRIST, LEFT: ICD-10-CM

## 2021-11-30 DIAGNOSIS — Z78.0 POSTMENOPAUSAL: ICD-10-CM

## 2021-11-30 DIAGNOSIS — Z00.00 ENCOUNTER FOR ANNUAL HEALTH EXAMINATION: Primary | ICD-10-CM

## 2021-11-30 DIAGNOSIS — Z13.31 DEPRESSION SCREENING: ICD-10-CM

## 2021-11-30 DIAGNOSIS — Z13.820 SCREENING FOR OSTEOPOROSIS: ICD-10-CM

## 2021-11-30 DIAGNOSIS — K21.00 GASTROESOPHAGEAL REFLUX DISEASE WITH ESOPHAGITIS WITHOUT HEMORRHAGE: ICD-10-CM

## 2021-11-30 DIAGNOSIS — D12.2 ADENOMATOUS POLYP OF ASCENDING COLON: ICD-10-CM

## 2021-11-30 DIAGNOSIS — E78.49 FAMILIAL MULTIPLE LIPOPROTEIN-TYPE HYPERLIPIDEMIA: ICD-10-CM

## 2021-11-30 DIAGNOSIS — K58.2 IRRITABLE BOWEL SYNDROME WITH BOTH CONSTIPATION AND DIARRHEA: ICD-10-CM

## 2021-11-30 DIAGNOSIS — M77.8 RIGHT WRIST TENDONITIS: ICD-10-CM

## 2021-11-30 PROBLEM — H04.559 ACQUIRED STENOSIS OF NASOLACRIMAL DUCT: Status: RESOLVED | Noted: 2019-07-09 | Resolved: 2021-11-30

## 2021-11-30 PROCEDURE — G0444 DEPRESSION SCREEN ANNUAL: HCPCS | Performed by: FAMILY MEDICINE

## 2021-11-30 PROCEDURE — 77080 DXA BONE DENSITY AXIAL: CPT | Performed by: FAMILY MEDICINE

## 2021-11-30 PROCEDURE — 99213 OFFICE O/P EST LOW 20 MIN: CPT | Performed by: FAMILY MEDICINE

## 2021-11-30 PROCEDURE — G0439 PPPS, SUBSEQ VISIT: HCPCS | Performed by: FAMILY MEDICINE

## 2021-11-30 RX ORDER — PIOGLITAZONEHYDROCHLORIDE 30 MG/1
30 TABLET ORAL DAILY
Qty: 90 TABLET | Refills: 1 | Status: SHIPPED | OUTPATIENT
Start: 2021-11-30

## 2021-11-30 RX ORDER — PANTOPRAZOLE SODIUM 40 MG/1
40 TABLET, DELAYED RELEASE ORAL DAILY
Qty: 90 TABLET | Refills: 0 | Status: SHIPPED | OUTPATIENT
Start: 2021-11-30

## 2021-11-30 NOTE — PATIENT INSTRUCTIONS
Consider shingle vaccine. -- pharmacy    Podiatry evaluation    Continue diabetic care    rec mammogram    rec dexa scan    Continue present medications    Recheck 6 months with labs          [place] Person CLARA Portillo's SCREENING SCHEDULE   Tests on this list are r with risk of osteoporosis or estrogen deficiency. Covered yearly for long-term glucocorticoid medication use (Steroids) No results found for this or any previous visit.       No recommendations at this time   Pap and Pelvic    Pap   Covered every 2 years Dilated Eye Exam Annually 03/01/2021       Recommended Websites for Advanced Directives    [email or web address] org/publications/Documents/personal_[email or web address] pdf  An information packet, including necessary form from the [employer] website.      htt

## 2021-11-30 NOTE — PROGRESS NOTES
CC: Annual Physical Exam    HPI:   Maris Moon is a 70year old female who presents for a complete physical exam.  Patient complains of chronic pain of both wrists.   Patient with surgery on the right wrist in the past.  Patient notes overuse doing th 3.2 2.8 - 4.4 g/dL    A/G Ratio 1.2 1.0 - 2.0    FASTING Yes    HEMOGLOBIN A1C   Result Value Ref Range    HgbA1C 6.6 (H) <5.7 %    Estimated Average Glucose 143 (H) 68 - 126 mg/dL   LIPID PANEL   Result Value Ref Range    Cholesterol, Total 148 <200 mg/dL Smoking status: Never Smoker      Smokeless tobacco: Never Used    Vaping Use      Vaping Use: Never used    Substance and Sexual Activity      Alcohol use:  Yes        Alcohol/week: 0.0 standard drinks        Comment: rare- one per month- wine/beer      Dr Time taken: 11/30/2021 11:02 AM  Entry User: Cristian Song MA  Screening Method: Finger Rub  Finger Rub Result: Pass         Visual Acuity                   Cognitive Assessment     What day of the week is this?: Correct    What month is it?: Blake kg)   SpO2 98%   BMI 23.28 kg/m²  Estimated body mass index is 23.28 kg/m² as calculated from the following:    Height as of this encounter: 5' 5.25\" (1.657 m). Weight as of this encounter: 141 lb (64 kg). Vital signs reviewed. Appears stated age, wel a complete physical exam.     1. Encounter for annual health examination  General health check today    2. Depression screening  Negative screening  - DEPRESSION SCREEN ANNUAL    3.  Screening for osteoporosis  Patient due for bone density scanning continue bowel syndrome with both constipation and diarrhea  Pud (peptic ulcer disease)  Right wrist tendonitis  Tendonitis of wrist, left    Patient Instructions     Consider shingle vaccine. -- pharmacy    Podiatry evaluation    Continue diabetic care    kam ledezma 4 years -    Fecal Occult Blood Test Covered annually -   Bone Density Screening    Bone density screening    Covered every 2 years after age 72 if diagnosed with risk of osteoporosis or estrogen deficiency.     Covered yearly for long-term glucocorticoid m Comment:      Unable to calculate due to Urine Microalbumin <0.5 mg/dL         LDL Annually Lab Results   Component Value Date    LDL 83 11/19/2021       Dilated Eye Exam Annually 03/01/2021       Recommended Websites for Advanced Directives    http://www.

## 2021-12-07 ENCOUNTER — TELEPHONE (OUTPATIENT)
Dept: FAMILY MEDICINE CLINIC | Facility: CLINIC | Age: 71
End: 2021-12-07

## 2021-12-07 NOTE — TELEPHONE ENCOUNTER
not sick, daughter had symptoms,tested negative 12/3  tested positive 12/7. Should Candance Hymen get tested? No future appointments.

## 2021-12-07 NOTE — TELEPHONE ENCOUNTER
Patient called. Last saw daughter 11/27. Daughter tested 12/3- negative. Daughter tested positive today. Pt without symptoms. Pt and  currently waiting for testing to be done.  Patient to let us know if positive and continue to mask and wash hands

## 2022-03-22 RX ORDER — PANTOPRAZOLE SODIUM 40 MG/1
40 TABLET, DELAYED RELEASE ORAL DAILY
Qty: 90 TABLET | Refills: 3 | Status: SHIPPED | OUTPATIENT
Start: 2022-03-22 | End: 2023-05-25

## 2022-03-22 NOTE — TELEPHONE ENCOUNTER
Future appt:    Last Appointment with provider:   11/30/2021  [place] [facility]:  11/30/2021- [address]-  Pt was advised to return in 6 months    Pantoprazole refilled 11/30/21 for #90, 0 refills        Cholesterol, Total (mg/dL)   Date Value   11/19/2021 148     HDL Cholesterol (mg/dL)   Date Value   11/19/2021 51     LDL Cholesterol (mg/dL)   Date Value   11/19/2021 83     Triglycerides (mg/dL)   Date Value   11/19/2021 72     Lab Results   Component Value Date     (H) 11/19/2021    A1C 6.6 (H) 11/19/2021     Lab Results   Component Value Date    TSH 2.010 08/12/2021       No follow-ups on file.

## 2022-04-13 RX ORDER — BLOOD SUGAR DIAGNOSTIC
1 STRIP MISCELLANEOUS DAILY
Qty: 100 STRIP | Refills: 1 | Status: SHIPPED | OUTPATIENT
Start: 2022-04-13

## 2022-04-13 NOTE — TELEPHONE ENCOUNTER
Future appt:    Last Appointment with provider:   11/30/2021  Last appointment at [facility]:  11/30/2021- [address]-  Recheck with labs with appt due in 6 months      One Touch Test Strips refilled on 9/20/21 for 6 month supply    Pt due for labs and med f/u next month  LM for pt- asked Martha Sims to call back to get scheduled    Please enter lab orders. Cholesterol, Total (mg/dL)   Date Value   11/19/2021 148     HDL Cholesterol (mg/dL)   Date Value   11/19/2021 51     LDL Cholesterol (mg/dL)   Date Value   11/19/2021 83     Triglycerides (mg/dL)   Date Value   11/19/2021 72     Lab Results   Component Value Date     (H) 11/19/2021    A1C 6.6 (H) 11/19/2021     Lab Results   Component Value Date    TSH 2.010 08/12/2021       No follow-ups on file.

## 2022-04-19 ENCOUNTER — PATIENT MESSAGE (OUTPATIENT)
Dept: FAMILY MEDICINE CLINIC | Facility: CLINIC | Age: 72
End: 2022-04-19

## 2022-04-19 NOTE — TELEPHONE ENCOUNTER
From: Christiano Rosales  To: Zabrina Hummel MD  Sent: 4/19/2022 4:46 PM CDT  Subject: 2nd booster     Should I wait for a surge in cases, or before we travel in July by train to [place], or get it now. First booster was Nov 2. Thanks.

## 2022-05-04 RX ORDER — PIOGLITAZONEHYDROCHLORIDE 30 MG/1
TABLET ORAL
Qty: 90 TABLET | Refills: 0 | Status: SHIPPED | OUTPATIENT
Start: 2022-05-04

## 2022-05-04 NOTE — TELEPHONE ENCOUNTER
Future appt: Your appointments     Date & Time Appointment Department [facility])    May 31, 2022  8:30 AM CDT Laboratory Visit with REF Catia Manna [facility] Lab ([facility] Ref [facility])        [place] 10, 2022 11:30 AM CDT Follow Up Visit with Louis Wesley MD [facility] ([facility])            [facility] [place]  [phone]  [address] [facility]  [facility] Ref Lab [place]  [phone] [id number]  [phone]        Last Appointment with provider:   11/30/2021- pX  Last appointment at [facility] [place]:  11/30/2021    Pioglitazone refilled on 11/30/21 for #90, 1 refill        Cholesterol, Total (mg/dL)   Date Value   11/19/2021 148     HDL Cholesterol (mg/dL)   Date Value   11/19/2021 51     LDL Cholesterol (mg/dL)   Date Value   11/19/2021 83     Triglycerides (mg/dL)   Date Value   11/19/2021 72     Lab Results   Component Value Date     (H) 11/19/2021    A1C 6.6 (H) 11/19/2021     Lab Results   Component Value Date    TSH 2.010 08/12/2021       No follow-ups on file.

## 2022-05-27 ENCOUNTER — TELEPHONE (OUTPATIENT)
Dept: FAMILY MEDICINE CLINIC | Facility: CLINIC | Age: 72
End: 2022-05-27

## 2022-05-27 NOTE — TELEPHONE ENCOUNTER
Need lab orders for appt on 5/31/22.     Future Appointments   Date Time Provider Nayeli Marissa   5/31/2022  8:30 AM [facility]   6/10/2022 11:30 AM Josefina Heard MD [facility] [place]

## 2022-05-27 NOTE — TELEPHONE ENCOUNTER
Please advise on lab orders to be entered for lab appointment on 5/31.     Future Appointments   Date Time Provider Nayeli Ann   5/31/2022  8:30 AM [facility]   6/10/2022 11:30 AM Linda Raza MD [facility] [place]

## 2022-05-31 ENCOUNTER — TELEPHONE (OUTPATIENT)
Dept: FAMILY MEDICINE CLINIC | Facility: CLINIC | Age: 72
End: 2022-05-31

## 2022-05-31 ENCOUNTER — LABORATORY ENCOUNTER (OUTPATIENT)
Dept: LAB | Age: 72
End: 2022-05-31
Attending: FAMILY MEDICINE
Payer: MEDICARE

## 2022-05-31 DIAGNOSIS — E11.9 CONTROLLED TYPE 2 DIABETES MELLITUS WITHOUT COMPLICATION, WITHOUT LONG-TERM CURRENT USE OF INSULIN (HCC): ICD-10-CM

## 2022-05-31 DIAGNOSIS — E11.9 CONTROLLED TYPE 2 DIABETES MELLITUS WITHOUT COMPLICATION, WITHOUT LONG-TERM CURRENT USE OF INSULIN (HCC): Primary | ICD-10-CM

## 2022-05-31 LAB
ALBUMIN SERPL-MCNC: 3.9 G/DL (ref 3.4–5)
ALBUMIN/GLOB SERPL: 1.2 {RATIO} (ref 1–2)
ALP LIVER SERPL-CCNC: 33 U/L
ALT SERPL-CCNC: 24 U/L
ANION GAP SERPL CALC-SCNC: 6 MMOL/L (ref 0–18)
AST SERPL-CCNC: 25 U/L (ref 15–37)
BASOPHILS # BLD AUTO: 0.05 X10(3) UL (ref 0–0.2)
BASOPHILS NFR BLD AUTO: 1 %
BILIRUB SERPL-MCNC: 0.5 MG/DL (ref 0.1–2)
BUN BLD-MCNC: 17 MG/DL (ref 7–18)
CALCIUM BLD-MCNC: 9.5 MG/DL (ref 8.5–10.1)
CHLORIDE SERPL-SCNC: 112 MMOL/L (ref 98–112)
CO2 SERPL-SCNC: 25 MMOL/L (ref 21–32)
CREAT BLD-MCNC: 0.7 MG/DL
EOSINOPHIL # BLD AUTO: 0.08 X10(3) UL (ref 0–0.7)
EOSINOPHIL NFR BLD AUTO: 1.6 %
ERYTHROCYTE [DISTWIDTH] IN BLOOD BY AUTOMATED COUNT: 15.4 %
EST. AVERAGE GLUCOSE BLD GHB EST-MCNC: 143 MG/DL (ref 68–126)
FASTING STATUS PATIENT QL REPORTED: YES
GLOBULIN PLAS-MCNC: 3.3 G/DL (ref 2.8–4.4)
GLUCOSE BLD-MCNC: 98 MG/DL (ref 70–99)
HBA1C MFR BLD: 6.6 % (ref ?–5.7)
HCT VFR BLD AUTO: 40.1 %
HGB BLD-MCNC: 12.4 G/DL
IMM GRANULOCYTES # BLD AUTO: 0.01 X10(3) UL (ref 0–1)
IMM GRANULOCYTES NFR BLD: 0.2 %
LYMPHOCYTES # BLD AUTO: 1.55 X10(3) UL (ref 1–4)
LYMPHOCYTES NFR BLD AUTO: 30.8 %
MCH RBC QN AUTO: 28.2 PG (ref 26–34)
MCHC RBC AUTO-ENTMCNC: 30.9 G/DL (ref 31–37)
MCV RBC AUTO: 91.1 FL
MONOCYTES # BLD AUTO: 0.51 X10(3) UL (ref 0.1–1)
MONOCYTES NFR BLD AUTO: 10.1 %
NEUTROPHILS # BLD AUTO: 2.84 X10 (3) UL (ref 1.5–7.7)
NEUTROPHILS # BLD AUTO: 2.84 X10(3) UL (ref 1.5–7.7)
NEUTROPHILS NFR BLD AUTO: 56.3 %
OSMOLALITY SERPL CALC.SUM OF ELEC: 298 MOSM/KG (ref 275–295)
PLATELET # BLD AUTO: 342 10(3)UL (ref 150–450)
POTASSIUM SERPL-SCNC: 4.3 MMOL/L (ref 3.5–5.1)
PROT SERPL-MCNC: 7.2 G/DL (ref 6.4–8.2)
RBC # BLD AUTO: 4.4 X10(6)UL
SODIUM SERPL-SCNC: 143 MMOL/L (ref 136–145)
WBC # BLD AUTO: 5 X10(3) UL (ref 4–11)

## 2022-05-31 PROCEDURE — 85025 COMPLETE CBC W/AUTO DIFF WBC: CPT

## 2022-05-31 PROCEDURE — 83036 HEMOGLOBIN GLYCOSYLATED A1C: CPT

## 2022-05-31 PROCEDURE — 36415 COLL VENOUS BLD VENIPUNCTURE: CPT

## 2022-05-31 PROCEDURE — 80053 COMPREHEN METABOLIC PANEL: CPT

## 2022-05-31 NOTE — TELEPHONE ENCOUNTER
Future appt: Your appointments     Date & Time Appointment Department [facility])    [place] 10, 2022 11:30 AM CDT Follow Up Visit with Carolina Mortensen MD [facility] ([facility])            [address] [place] [address] Lakshmi  [facility] [phone]  [phone]        Last Appointment with provider:   Visit date not found  Last appointment at [facility]:  Visit date not found   last lab: 11/19/21  Last px:  11/30/21    Pt here for labs- need orders please. Routed to - [facility] for CR        Cholesterol, Total (mg/dL)   Date Value   11/19/2021 148     HDL Cholesterol (mg/dL)   Date Value   11/19/2021 51     LDL Cholesterol (mg/dL)   Date Value   11/19/2021 83     Triglycerides (mg/dL)   Date Value   11/19/2021 72     Lab Results   Component Value Date     (H) 11/19/2021    A1C 6.6 (H) 11/19/2021     Lab Results   Component Value Date    TSH 2.010 08/12/2021       No follow-ups on file.

## 2022-06-01 ENCOUNTER — TELEPHONE (OUTPATIENT)
Dept: FAMILY MEDICINE CLINIC | Facility: CLINIC | Age: 72
End: 2022-06-01

## 2022-06-01 NOTE — TELEPHONE ENCOUNTER
----- Message from JACKLYN Rios sent at 6/1/2022  1:18 PM CDT -----  Mychart message sent. Dr. Philip Walls out of the office, follow up appointment scheduled 06/10/22. A1C stable from prior. Metabolic and CBC stable otherwise from prior. Review in detail with Dr. Philip Walls at upcoming appointment.

## 2022-06-01 NOTE — TELEPHONE ENCOUNTER
Pt informed. Pt agreed to discuss further at upcoming appt.       Future Appointments   Date Time Provider Nayeli Marissa   6/10/2022 11:30 AM Ana Maria Azar MD [facility] [place]

## 2022-06-10 ENCOUNTER — OFFICE VISIT (OUTPATIENT)
Dept: FAMILY MEDICINE CLINIC | Facility: CLINIC | Age: 72
End: 2022-06-10
Payer: MEDICARE

## 2022-06-10 VITALS
TEMPERATURE: 97 F | WEIGHT: 142 LBS | RESPIRATION RATE: 16 BRPM | HEIGHT: 65.25 IN | DIASTOLIC BLOOD PRESSURE: 70 MMHG | SYSTOLIC BLOOD PRESSURE: 120 MMHG | BODY MASS INDEX: 23.37 KG/M2 | HEART RATE: 74 BPM | OXYGEN SATURATION: 97 %

## 2022-06-10 DIAGNOSIS — E78.49 FAMILIAL MULTIPLE LIPOPROTEIN-TYPE HYPERLIPIDEMIA: ICD-10-CM

## 2022-06-10 DIAGNOSIS — E55.9 VITAMIN D DEFICIENCY: ICD-10-CM

## 2022-06-10 DIAGNOSIS — E11.9 CONTROLLED TYPE 2 DIABETES MELLITUS WITHOUT COMPLICATION, WITHOUT LONG-TERM CURRENT USE OF INSULIN (HCC): Primary | ICD-10-CM

## 2022-06-10 DIAGNOSIS — K58.2 IRRITABLE BOWEL SYNDROME WITH BOTH CONSTIPATION AND DIARRHEA: ICD-10-CM

## 2022-06-10 DIAGNOSIS — M85.89 OSTEOPENIA OF MULTIPLE SITES: ICD-10-CM

## 2022-06-10 DIAGNOSIS — K21.00 GASTROESOPHAGEAL REFLUX DISEASE WITH ESOPHAGITIS WITHOUT HEMORRHAGE: ICD-10-CM

## 2022-06-10 PROCEDURE — 99214 OFFICE O/P EST MOD 30 MIN: CPT | Performed by: FAMILY MEDICINE

## 2022-06-10 NOTE — PATIENT INSTRUCTIONS
Monitor diet and exercise    rec fasting labs with physical in NOvember    Continue medications    rec calcium-- 600mg 2 x  Day    rec dexa 11/2023

## 2022-07-18 ENCOUNTER — TELEPHONE (OUTPATIENT)
Dept: FAMILY MEDICINE CLINIC | Facility: CLINIC | Age: 72
End: 2022-07-18

## 2022-07-18 NOTE — TELEPHONE ENCOUNTER
Need lab order, patient scheduled her medicare wellness exam.  Future Appointments   Date Time Provider Nayeli Ann   11/30/2022 10:30 AM Burke Villela MD [facility] [place]

## 2022-07-18 NOTE — TELEPHONE ENCOUNTER
Future Appointments   Date Time Provider Nayeli Ann   11/21/2022  8:30 AM REF [facility] REF [facility]   11/30/2022 10:30 AM Gregorio Blackburn MD [facility] [place]

## 2022-07-20 RX ORDER — PIOGLITAZONEHYDROCHLORIDE 30 MG/1
TABLET ORAL
Qty: 90 TABLET | Refills: 0 | Status: SHIPPED | OUTPATIENT
Start: 2022-07-20

## 2022-07-21 NOTE — TELEPHONE ENCOUNTER
Future appt: Your appointments     Date & Time Appointment Department [facility])    Nov 21, 2022  8:30 AM CST Laboratory Visit with REF Brittny Perdomo Reference Lab ([facility] Ref Lab [place])        Nov 30, 2022 10:30 AM CST Medicare Annual Well Visit with Ana Maria Azar MD [facility] ([facility])            25 [facility] [place]  [address] [phone]  [facility] Reference Lab  EDW Ref Lab [place]  [address] [id number]  [phone]        Last Appointment with provider:   6/10/2022  Last appointment at [facility] [place]:  6/10/2022  Last px: 11/30/21    Pioglitazone refilled 5/4/22 for #90, 0 refills  Cholesterol, Total (mg/dL)   Date Value   11/19/2021 148     HDL Cholesterol (mg/dL)   Date Value   11/19/2021 51     LDL Cholesterol (mg/dL)   Date Value   11/19/2021 83     Triglycerides (mg/dL)   Date Value   11/19/2021 72     Lab Results   Component Value Date     (H) 05/31/2022    A1C 6.6 (H) 05/31/2022     Lab Results   Component Value Date    TSH 2.010 08/12/2021       No follow-ups on file.

## 2022-08-09 ENCOUNTER — PATIENT MESSAGE (OUTPATIENT)
Dept: FAMILY MEDICINE CLINIC | Facility: CLINIC | Age: 72
End: 2022-08-09

## 2022-08-09 NOTE — TELEPHONE ENCOUNTER
From: Christiano Rosales  To: Zabrina Hummel MD  Sent: 8/9/2022 8:24 AM CDT  Subject: Cyst?? Have had this for 2 weeks. Mildly tender, swollen, hurt a bit occasionally. even at rest. What do you advise?

## 2022-08-09 NOTE — TELEPHONE ENCOUNTER
Pt to monitor. Martha Sahu for ice to area after activity if having pain.   Appointment if ongoing concerns/ issues

## 2022-08-10 NOTE — TELEPHONE ENCOUNTER
Pt informed.   Pt scheduled appt for eval    Future Appointments   Date Time Provider Nayeli Marissa   8/23/2022  2:30 PM Alla Sanderson MD [facility] [place]   11/21/2022  8:30 AM REF [facility] REF [facility]   11/30/2022 10:30 AM Alla Sanderson MD [facility] [place]

## 2022-08-22 ENCOUNTER — TELEPHONE (OUTPATIENT)
Dept: FAMILY MEDICINE CLINIC | Facility: CLINIC | Age: 72
End: 2022-08-22

## 2022-08-22 NOTE — TELEPHONE ENCOUNTER
Called to confirm appt with patient, patient took Covid test, waiting for results. Has appt.tomorrow .   Future Appointments   Date Time Provider Nayeli Ann   8/23/2022  2:30 PM Gregory Yusuf MD [facility] [place] [facility]   11/21/2022  8:30 AM REF [facility] REF [facility]   11/30/2022 10:30 AM Gregory Yusuf MD [facility] [place]

## 2022-08-22 NOTE — TELEPHONE ENCOUNTER
Pt was exposed to potential case of covid on 8/11 and 8/13. Pt states the person she was with was exposed to covid. David Schulz states then that person became symptomatic and David  assumed that individual  had covid as well. Pt states she has been fine, however, today pt started to notice a sore throat that is still present. Pt also has slight post nasal drip and mild runny nose. Pt states she went to covid testing facility in [place]- pt states her rapid was negative today and PCR is still pending. Pt asked if PCR is negative -pt asked if she can keep appt? Pt informed she will need to reschedule appt. Pt was going to come in for wrist growth and inflammation. Routed to CR for review.     Future Appointments   Date Time Provider Nayeli Ann   8/23/2022  2:30 PM Chrissie Gomez MD [facility] [place]   11/21/2022  8:30 AM REF [facility] REF [facility]   11/30/2022 10:30 AM Chrissie Gomez MD [facility] [place]

## 2022-08-23 ENCOUNTER — TELEMEDICINE (OUTPATIENT)
Dept: FAMILY MEDICINE CLINIC | Facility: CLINIC | Age: 72
End: 2022-08-23
Payer: MEDICARE

## 2022-08-23 DIAGNOSIS — K58.2 IRRITABLE BOWEL SYNDROME WITH BOTH CONSTIPATION AND DIARRHEA: ICD-10-CM

## 2022-08-23 DIAGNOSIS — M67.432 GANGLION CYST OF WRIST, LEFT: Primary | ICD-10-CM

## 2022-08-23 DIAGNOSIS — J02.9 SORE THROAT: ICD-10-CM

## 2022-08-23 PROCEDURE — 99213 OFFICE O/P EST LOW 20 MIN: CPT | Performed by: FAMILY MEDICINE

## 2022-08-23 RX ORDER — FENOFIBRATE 160 MG/1
160 TABLET ORAL NIGHTLY
Qty: 90 TABLET | Refills: 3 | Status: SHIPPED | OUTPATIENT
Start: 2022-08-23

## 2022-10-05 NOTE — TELEPHONE ENCOUNTER
Future appt: Your appointments     Date & Time Appointment Department [facility])    Nov 21, 2022  8:30 AM CST Laboratory Visit with REF Laura Watts Reference Lab (ED[facility] Ref Lab [place])        Nov 30, 2022 10:30 AM CST Medicare Annual Well Visit with Lyndsey Lei MD 25 [facility] [place] ([facility])            [facility] [place]  [address] [phone]  [facility] Reference Lab  EDW Ref Lab [place]  [address] [id number]  [phone]        Last Appointment with provider:   6/10/2022  Last appointment at [facility] [place]:  6/10/2022  Last px: 11/30/21    Metformin refilled 11/30/21 for one year  Cholesterol, Total (mg/dL)   Date Value   11/19/2021 148     HDL Cholesterol (mg/dL)   Date Value   11/19/2021 51     LDL Cholesterol (mg/dL)   Date Value   11/19/2021 83     Triglycerides (mg/dL)   Date Value   11/19/2021 72     Lab Results   Component Value Date     (H) 05/31/2022    A1C 6.6 (H) 05/31/2022     Lab Results   Component Value Date    TSH 2.010 08/12/2021       No follow-ups on file.

## 2022-10-06 RX ORDER — PIOGLITAZONEHYDROCHLORIDE 30 MG/1
TABLET ORAL
Qty: 90 TABLET | Refills: 0 | Status: SHIPPED | OUTPATIENT
Start: 2022-10-06

## 2022-10-06 NOTE — TELEPHONE ENCOUNTER
Future appt: Your appointments     Date & Time Appointment Department [facility])    Nov 21, 2022  8:30 AM CST Laboratory Visit with REF Red Barges Reference Lab (ED[facility] Ref Lab [place])        Nov 30, 2022 10:30 AM CST Medicare Annual Well Visit with Coleen Shin MD 25 [facility], [place] ([facility])            [facility]  [address] [phone]  [address] [facility] Reference Lab  EDW Ref Lab [address] [place] [address]  [phone]        Last Appointment with provider:   6/10/2022 for diabetes follow up. Last appointment at [facility]:  6/10/2022  Cholesterol, Total (mg/dL)   Date Value   11/19/2021 148     HDL Cholesterol (mg/dL)   Date Value   11/19/2021 51     LDL Cholesterol (mg/dL)   Date Value   11/19/2021 83     Triglycerides (mg/dL)   Date Value   11/19/2021 72     Lab Results   Component Value Date     (H) 05/31/2022    A1C 6.6 (H) 05/31/2022     Lab Results   Component Value Date    TSH 2.010 08/12/2021       No follow-ups on file.

## 2022-10-14 DIAGNOSIS — E11.9 CONTROLLED TYPE 2 DIABETES MELLITUS WITHOUT COMPLICATION, WITHOUT LONG-TERM CURRENT USE OF INSULIN (HCC): ICD-10-CM

## 2022-10-14 RX ORDER — BLOOD SUGAR DIAGNOSTIC
STRIP MISCELLANEOUS
Qty: 100 STRIP | Refills: 0 | Status: SHIPPED | OUTPATIENT
Start: 2022-10-14

## 2022-10-14 NOTE — TELEPHONE ENCOUNTER
Future appt: Your appointments     Date & Time Appointment Department [facility])    Nov 21, 2022  8:30 AM CST Laboratory Visit with REF Lucas Allison Reference Lab ([facility] Ref Lab [place])        Nov 30, 2022 10:30 AM CST Medicare Annual Well Visit with Tia Correa MD 25 [facility], [place] ([facility])            25 [facility] [place]  [address] [phone]  250 [facility] Reference Lab  EDW Ref Lab [place]  [address] [id number]  [phone]        Last Appointment with provider:   6/10/2022  Last appointment at [facility] [place]:  6/10/2022  Cholesterol, Total (mg/dL)   Date Value   11/19/2021 148     HDL Cholesterol (mg/dL)   Date Value   11/19/2021 51     LDL Cholesterol (mg/dL)   Date Value   11/19/2021 83     Triglycerides (mg/dL)   Date Value   11/19/2021 72     Lab Results   Component Value Date     (H) 05/31/2022    A1C 6.6 (H) 05/31/2022     Lab Results   Component Value Date    TSH 2.010 08/12/2021     Last RF:  4/13/2022    No follow-ups on file.

## 2022-11-21 ENCOUNTER — LABORATORY ENCOUNTER (OUTPATIENT)
Dept: LAB | Age: 72
End: 2022-11-21
Attending: FAMILY MEDICINE
Payer: MEDICARE

## 2022-11-21 DIAGNOSIS — E78.49 FAMILIAL MULTIPLE LIPOPROTEIN-TYPE HYPERLIPIDEMIA: ICD-10-CM

## 2022-11-21 DIAGNOSIS — E11.9 CONTROLLED TYPE 2 DIABETES MELLITUS WITHOUT COMPLICATION, WITHOUT LONG-TERM CURRENT USE OF INSULIN (HCC): ICD-10-CM

## 2022-11-21 DIAGNOSIS — E55.9 VITAMIN D DEFICIENCY: ICD-10-CM

## 2022-11-21 LAB
ALBUMIN SERPL-MCNC: 4.3 G/DL (ref 3.4–5)
ALBUMIN/GLOB SERPL: 1.3 {RATIO} (ref 1–2)
ALP LIVER SERPL-CCNC: 34 U/L
ALT SERPL-CCNC: 26 U/L
ANION GAP SERPL CALC-SCNC: 4 MMOL/L (ref 0–18)
AST SERPL-CCNC: 17 U/L (ref 15–37)
BASOPHILS # BLD AUTO: 0.06 X10(3) UL (ref 0–0.2)
BASOPHILS NFR BLD AUTO: 1.2 %
BILIRUB SERPL-MCNC: 0.5 MG/DL (ref 0.1–2)
BILIRUB UR QL STRIP.AUTO: NEGATIVE
BUN BLD-MCNC: 17 MG/DL (ref 7–18)
CALCIUM BLD-MCNC: 9.5 MG/DL (ref 8.5–10.1)
CHLORIDE SERPL-SCNC: 108 MMOL/L (ref 98–112)
CHOLEST SERPL-MCNC: 146 MG/DL (ref ?–200)
CLARITY UR REFRACT.AUTO: CLEAR
CO2 SERPL-SCNC: 28 MMOL/L (ref 21–32)
COLOR UR AUTO: YELLOW
CREAT BLD-MCNC: 0.68 MG/DL
CREAT UR-SCNC: 40.3 MG/DL
EOSINOPHIL # BLD AUTO: 0.09 X10(3) UL (ref 0–0.7)
EOSINOPHIL NFR BLD AUTO: 1.8 %
ERYTHROCYTE [DISTWIDTH] IN BLOOD BY AUTOMATED COUNT: 15.6 %
EST. AVERAGE GLUCOSE BLD GHB EST-MCNC: 140 MG/DL (ref 68–126)
FASTING PATIENT LIPID ANSWER: YES
FASTING STATUS PATIENT QL REPORTED: YES
GFR SERPLBLD BASED ON 1.73 SQ M-ARVRAT: 92 ML/MIN/1.73M2 (ref 60–?)
GLOBULIN PLAS-MCNC: 3.3 G/DL (ref 2.8–4.4)
GLUCOSE BLD-MCNC: 95 MG/DL (ref 70–99)
GLUCOSE UR STRIP.AUTO-MCNC: NEGATIVE MG/DL
HBA1C MFR BLD: 6.5 % (ref ?–5.7)
HCT VFR BLD AUTO: 42.8 %
HDLC SERPL-MCNC: 58 MG/DL (ref 40–59)
HGB BLD-MCNC: 13.5 G/DL
IMM GRANULOCYTES # BLD AUTO: 0.01 X10(3) UL (ref 0–1)
IMM GRANULOCYTES NFR BLD: 0.2 %
KETONES UR STRIP.AUTO-MCNC: NEGATIVE MG/DL
LDLC SERPL CALC-MCNC: 73 MG/DL (ref ?–100)
LEUKOCYTE ESTERASE UR QL STRIP.AUTO: NEGATIVE
LYMPHOCYTES # BLD AUTO: 1.56 X10(3) UL (ref 1–4)
LYMPHOCYTES NFR BLD AUTO: 30.5 %
MCH RBC QN AUTO: 28.5 PG (ref 26–34)
MCHC RBC AUTO-ENTMCNC: 31.5 G/DL (ref 31–37)
MCV RBC AUTO: 90.3 FL
MICROALBUMIN UR-MCNC: 0.57 MG/DL
MICROALBUMIN/CREAT 24H UR-RTO: 14.1 UG/MG (ref ?–30)
MONOCYTES # BLD AUTO: 0.42 X10(3) UL (ref 0.1–1)
MONOCYTES NFR BLD AUTO: 8.2 %
NEUTROPHILS # BLD AUTO: 2.98 X10 (3) UL (ref 1.5–7.7)
NEUTROPHILS # BLD AUTO: 2.98 X10(3) UL (ref 1.5–7.7)
NEUTROPHILS NFR BLD AUTO: 58.1 %
NITRITE UR QL STRIP.AUTO: NEGATIVE
NONHDLC SERPL-MCNC: 88 MG/DL (ref ?–130)
OSMOLALITY SERPL CALC.SUM OF ELEC: 291 MOSM/KG (ref 275–295)
PH UR STRIP.AUTO: 8 [PH] (ref 5–8)
PLATELET # BLD AUTO: 355 10(3)UL (ref 150–450)
POTASSIUM SERPL-SCNC: 4.5 MMOL/L (ref 3.5–5.1)
PROT SERPL-MCNC: 7.6 G/DL (ref 6.4–8.2)
PROT UR STRIP.AUTO-MCNC: NEGATIVE MG/DL
RBC # BLD AUTO: 4.74 X10(6)UL
RBC UR QL AUTO: NEGATIVE
SODIUM SERPL-SCNC: 140 MMOL/L (ref 136–145)
SP GR UR STRIP.AUTO: 1.01 (ref 1–1.03)
TRIGL SERPL-MCNC: 76 MG/DL (ref 30–149)
TSI SER-ACNC: 1.85 MIU/ML (ref 0.36–3.74)
UROBILINOGEN UR STRIP.AUTO-MCNC: <2 MG/DL
VIT D+METAB SERPL-MCNC: 30.3 NG/ML (ref 30–100)
VLDLC SERPL CALC-MCNC: 12 MG/DL (ref 0–30)
WBC # BLD AUTO: 5.1 X10(3) UL (ref 4–11)

## 2022-11-21 PROCEDURE — 80061 LIPID PANEL: CPT

## 2022-11-21 PROCEDURE — 80053 COMPREHEN METABOLIC PANEL: CPT

## 2022-11-21 PROCEDURE — 81003 URINALYSIS AUTO W/O SCOPE: CPT

## 2022-11-21 PROCEDURE — 82570 ASSAY OF URINE CREATININE: CPT

## 2022-11-21 PROCEDURE — 84443 ASSAY THYROID STIM HORMONE: CPT

## 2022-11-21 PROCEDURE — 82043 UR ALBUMIN QUANTITATIVE: CPT

## 2022-11-21 PROCEDURE — 36415 COLL VENOUS BLD VENIPUNCTURE: CPT

## 2022-11-21 PROCEDURE — 83036 HEMOGLOBIN GLYCOSYLATED A1C: CPT

## 2022-11-21 PROCEDURE — 85025 COMPLETE CBC W/AUTO DIFF WBC: CPT

## 2022-11-21 PROCEDURE — 82306 VITAMIN D 25 HYDROXY: CPT

## 2022-11-30 ENCOUNTER — TELEPHONE (OUTPATIENT)
Dept: FAMILY MEDICINE CLINIC | Facility: CLINIC | Age: 72
End: 2022-11-30

## 2022-11-30 NOTE — TELEPHONE ENCOUNTER
Pt had an appt today but had to be r/s due to provider being out. Appt was r/s top Jan 2023 but pt states that she will have new ins then and it won't cover here. Pt wants to know if she can see Dr Rosas  sometime in Dec. No openings besides SDA.      Your appointments     Date & Time Appointment Department [facility])    Jan 03, 2023 10:30 AM CST Medicare Annual Well Visit with Breanna Cerrato MD [address] [place] [address] ([facility])            [address] [place] [address] [facility] [phone]  [phone]

## 2022-11-30 NOTE — TELEPHONE ENCOUNTER
Pt contacted-  appt rescheduled    Future Appointments   Date Time Provider Nayeli Ann   12/14/2022  4:00 PM Catie Gerardo MD [facility] [place] [facility]

## 2022-12-02 ENCOUNTER — TELEPHONE (OUTPATIENT)
Dept: FAMILY MEDICINE CLINIC | Facility: CLINIC | Age: 72
End: 2022-12-02

## 2022-12-02 DIAGNOSIS — Z78.0 POSTMENOPAUSAL: ICD-10-CM

## 2022-12-02 DIAGNOSIS — Z13.820 SCREENING FOR OSTEOPOROSIS: Primary | ICD-10-CM

## 2022-12-02 NOTE — TELEPHONE ENCOUNTER
Order for dexa scan approved on 6/10/22 for Obdulio Bills. Pt request noted- to change service provider to [facility].   Routed to PCP      Future Appointments   Date Time Provider Nayeli Ann   12/14/2022  4:00 PM Catie Gerardo MD [facility] [place] [facility]

## 2022-12-13 ENCOUNTER — TELEPHONE (OUTPATIENT)
Dept: FAMILY MEDICINE CLINIC | Facility: CLINIC | Age: 72
End: 2022-12-13

## 2022-12-13 NOTE — TELEPHONE ENCOUNTER
Pt started sneezing last Tuesday, pt states she thought s/s were related to allergies. Pt then started with cough and runny nose on Thursday. Pt states she did check home covid test that day- was negative. (may have tested too soon). No fever reported. Pt advised to recheck home covid test- if still negative, pt informed should be fine to come in for appt. Pt agreed to call back.     Future Appointments   Date Time Provider Nayeli Marissa   12/14/2022  4:00 PM Jason Bee MD [facility] [place] [facility]

## 2022-12-13 NOTE — TELEPHONE ENCOUNTER
Reviewed. Bone density scan reviewed. Finding:  OSTEOPENIA    FRAX Score: 10 year probability of fracture  Major osteoporotic fracture   23 %   Hip fracture   8.3 %      Patient encouraged to engage in weightbearing exercises ideally walking and biking    All patients should ensure an adequate intake of dietary calcium and vitamin D. The NOF recommends adults under age 48 need 1000 mg of calcium and 400-800 international units of vitamin D daily. Patient's over the age of 48 are recommended to have 1200 mg of calcium and 800-1000 international units of vitamin D daily. PLAN TO D/W PT AT FUTURE APPOINTMENT    Results forwarded to patient via [id number] system. Patient encouraged to call for any questions or concerns.

## 2022-12-13 NOTE — TELEPHONE ENCOUNTER
Noted. Pt to call back if fever before appointment.   Future Appointments   Date Time Provider Nayeli Ann   12/14/2022  4:00 PM Marylou Engel MD [facility] [place] [facility]

## 2022-12-13 NOTE — TELEPHONE ENCOUNTER
Pt has a slight cough and runny nose. Ok to keep tomorrow's appt?      Your appointments     Date & Time Appointment Department [facility])    Dec 14, 2022  4:00 PM CST Medicare Annual Well Visit with Mary Poe MD [address] [place] [address] ([facility])            [address] [place] [address] [facility] [address] [phone]  [phone]

## 2022-12-14 ENCOUNTER — OFFICE VISIT (OUTPATIENT)
Dept: FAMILY MEDICINE CLINIC | Facility: CLINIC | Age: 72
End: 2022-12-14
Payer: MEDICARE

## 2022-12-14 VITALS
DIASTOLIC BLOOD PRESSURE: 82 MMHG | TEMPERATURE: 98 F | WEIGHT: 138.81 LBS | HEIGHT: 65.25 IN | SYSTOLIC BLOOD PRESSURE: 128 MMHG | HEART RATE: 81 BPM | BODY MASS INDEX: 22.85 KG/M2 | RESPIRATION RATE: 16 BRPM | OXYGEN SATURATION: 97 %

## 2022-12-14 DIAGNOSIS — L65.9 HAIR LOSS: ICD-10-CM

## 2022-12-14 DIAGNOSIS — K58.2 IRRITABLE BOWEL SYNDROME WITH BOTH CONSTIPATION AND DIARRHEA: ICD-10-CM

## 2022-12-14 DIAGNOSIS — K21.00 GASTROESOPHAGEAL REFLUX DISEASE WITH ESOPHAGITIS WITHOUT HEMORRHAGE: ICD-10-CM

## 2022-12-14 DIAGNOSIS — H04.123 DRY EYES: ICD-10-CM

## 2022-12-14 DIAGNOSIS — M67.432 GANGLION CYST OF WRIST, LEFT: ICD-10-CM

## 2022-12-14 DIAGNOSIS — M77.8 TENDONITIS OF WRIST, LEFT: ICD-10-CM

## 2022-12-14 DIAGNOSIS — D12.2 ADENOMATOUS POLYP OF ASCENDING COLON: ICD-10-CM

## 2022-12-14 DIAGNOSIS — K27.9 PUD (PEPTIC ULCER DISEASE): ICD-10-CM

## 2022-12-14 DIAGNOSIS — Z00.00 ENCOUNTER FOR ANNUAL HEALTH EXAMINATION: Primary | ICD-10-CM

## 2022-12-14 DIAGNOSIS — E78.49 FAMILIAL MULTIPLE LIPOPROTEIN-TYPE HYPERLIPIDEMIA: ICD-10-CM

## 2022-12-14 DIAGNOSIS — M77.8 RIGHT WRIST TENDONITIS: ICD-10-CM

## 2022-12-14 DIAGNOSIS — E11.9 CONTROLLED TYPE 2 DIABETES MELLITUS WITHOUT COMPLICATION, WITHOUT LONG-TERM CURRENT USE OF INSULIN (HCC): ICD-10-CM

## 2022-12-14 DIAGNOSIS — M85.89 OSTEOPENIA OF MULTIPLE SITES: ICD-10-CM

## 2022-12-14 PROBLEM — H25.9 AGE-RELATED CATARACT OF RIGHT EYE: Status: RESOLVED | Noted: 2019-07-09 | Resolved: 2022-12-14

## 2022-12-14 LAB — VIT B12 SERPL-MCNC: 287 PG/ML (ref 193–986)

## 2022-12-14 PROCEDURE — 1126F AMNT PAIN NOTED NONE PRSNT: CPT | Performed by: FAMILY MEDICINE

## 2022-12-14 PROCEDURE — 82607 VITAMIN B-12: CPT | Performed by: FAMILY MEDICINE

## 2022-12-14 PROCEDURE — G0439 PPPS, SUBSEQ VISIT: HCPCS | Performed by: FAMILY MEDICINE

## 2022-12-14 PROCEDURE — 99213 OFFICE O/P EST LOW 20 MIN: CPT | Performed by: FAMILY MEDICINE

## 2022-12-14 RX ORDER — DIPHENHYDRAMINE HCL 25 MG
25 CAPSULE ORAL EVERY 6 HOURS PRN
COMMUNITY

## 2022-12-14 NOTE — PATIENT INSTRUCTIONS
Recommend vaccines  Jairo Paul- at pharmacy    Mammogram due in January    Colonoscopy due 2023    Rec vit D-- 2000 units a day, can take 4000 units a day in winter    Ok to take biotin- mvi for hair and nails    Check b12 level    Osteopenia- stable-- improved in hip--recheck 2 years        Emili Portillo's SCREENING SCHEDULE   Tests on this list are recommended by your physician but may not be covered, or covered at this frequency, by your insurer. Please check with your insurance carrier before scheduling to verify coverage.    PREVENTATIVE SERVICES FREQUENCY &  COVERAGE DETAILS LAST COMPLETION DATE   Diabetes Screening    Fasting Blood Sugar /  Glucose    One screening every 12 months if never tested or if previously tested but not diagnosed with pre-diabetes   One screening every 6 months if diagnosed with pre-diabetes Lab Results   Component Value Date    GLU 95 11/21/2022        Cardiovascular Disease Screening    Lipid Panel  Cholesterol  Lipoprotein (HDL)  Triglycerides Covered every 5 years for all Medicare beneficiaries without apparent signs or symptoms of cardiovascular disease Lab Results   Component Value Date    CHOLEST 146 11/21/2022    HDL 58 11/21/2022    LDL 73 11/21/2022    TRIG 76 11/21/2022         Electrocardiogram (EKG)   Covered if needed at Welcome to Medicare, and non-screening if indicated for medical reasons 06/03/2020      Ultrasound Screening for Abdominal Aortic Aneurysm (AAA) Covered once in a lifetime for one of the following risk factors    Men who are 73-68 years old and have ever smoked    Anyone with a family history -     Colorectal Cancer Screening  Covered for ages 52-80; only need ONE of the following:    Colonoscopy   Covered every 10 years    Covered every 2 years if patient is at high risk or previous colonoscopy was abnormal 05/01/2018    Colorectal Cancer Screening due on 05/01/2023    Flexible Sigmoidoscopy   Covered every 4 years -    Fecal Occult Blood Test Covered annually -   Bone Density Screening    Bone density screening    Covered every 2 years after age 72 if diagnosed with risk of osteoporosis or estrogen deficiency. Covered yearly for long-term glucocorticoid medication use (Steroids) Last Dexa Scan:    XR DEXA BONE DENSITOMETRY (CPT=77080) 11/30/2021      No recommendations at this time   Pap and Pelvic    Pap   Covered every 2 years for women at normal risk; Annually if at high risk -  No recommendations at this time    Chlamydia Annually if high risk -  No recommendations at this time   Screening Mammogram    Mammogram     Recommend annually for all female patients aged 36 and older    One baseline mammogram covered for patients aged 32-38 01/13/2022    Mammogram due on 01/13/2023    Immunizations    Influenza Covered once per flu season  Please get every year 10/16/2022  No recommendations at this time    Pneumococcal Each vaccine (Ojooomn91 & Uzqtnxsty70) covered once after 65 Prevnar 13: 11/17/2014    Bpyltxlsd15: 10/30/2017     No recommendations at this time    Hepatitis B One screening covered for patients with certain risk factors   -  No recommendations at this time    Tetanus Toxoid Not covered by Medicare Part B unless medically necessary (cut with metal); may be covered with your pharmacy prescription benefits -    Tetanus, Diptheria and Pertusis TD and TDaP Not covered by Medicare Part B -  No recommendations at this time    Zoster Not covered by Medicare Part B; may be covered with your pharmacy  prescription benefits 12/29/2015  Zoster Vaccines(2 of 3) due on 02/23/2016       Diabetes      Hemoglobin A1C Annually; if last result is elevated, may be asked to retest more frequently.     Medicare covers every 3 months Lab Results   Component Value Date     (H) 11/21/2022    A1C 6.5 (H) 11/21/2022       No recommendations at this time    Creat/alb ratio Annually Lab Results   Component Value Date    MICROALBCREA 14.1 11/21/2022       LDL Annually Lab Results   Component Value Date    LDL 73 11/21/2022       Dilated Eye Exam Annually 03/08/2022

## 2022-12-16 ENCOUNTER — TELEPHONE (OUTPATIENT)
Dept: FAMILY MEDICINE CLINIC | Facility: CLINIC | Age: 72
End: 2022-12-16

## 2022-12-16 DIAGNOSIS — E53.8 B12 DEFICIENCY: Primary | ICD-10-CM

## 2022-12-16 NOTE — TELEPHONE ENCOUNTER
----- Message from Summer Prather MD sent at 12/15/2022  8:21 PM CST -----  LOW b12- goal is to be > 400. If pt has been taking oral b12 1000mg a day- might consider injection monthly. if not taking oral-- would rec omend that for 3 months and then recheck level    Will await repsonse from patient. Results forwarded to patient via [employer] system. Patient encouraged to call for any questions or concerns.

## 2022-12-16 NOTE — TELEPHONE ENCOUNTER
Pt contacted. Pt states she had seen her lab report via my chart. Pt states she started Vitamin B12 5,000 mcg sublingal yesterday. Pt also mentioned she has started Biotin 10,000 mcg with 100 mg of Keratin daily.     Discussed with CR  Pt was asked to recheck labs in 6 wks      Future Appointments   Date Time Provider Nayeli Ann   1/31/2023  1:30 PM REF [place] REF [facility]

## 2022-12-22 ENCOUNTER — TELEMEDICINE (OUTPATIENT)
Dept: FAMILY MEDICINE CLINIC | Facility: CLINIC | Age: 72
End: 2022-12-22
Payer: MEDICARE

## 2022-12-22 ENCOUNTER — TELEPHONE (OUTPATIENT)
Dept: FAMILY MEDICINE CLINIC | Facility: CLINIC | Age: 72
End: 2022-12-22

## 2022-12-22 VITALS — TEMPERATURE: 98 F | HEIGHT: 65.25 IN | BODY MASS INDEX: 22.72 KG/M2 | WEIGHT: 138 LBS

## 2022-12-22 DIAGNOSIS — J02.9 ACUTE SORE THROAT: ICD-10-CM

## 2022-12-22 DIAGNOSIS — U07.1 COVID: Primary | ICD-10-CM

## 2022-12-22 RX ORDER — GUAIFENESIN AND DEXTROMETHORPHAN HYDROBROMIDE 600; 30 MG/1; MG/1
1 TABLET, EXTENDED RELEASE ORAL EVERY 12 HOURS
Qty: 30 TABLET | Refills: 0 | Status: SHIPPED | OUTPATIENT
Start: 2022-12-22

## 2022-12-22 RX ORDER — AMOXICILLIN 500 MG/1
500 CAPSULE ORAL 3 TIMES DAILY
Qty: 30 CAPSULE | Refills: 0 | Status: SHIPPED | OUTPATIENT
Start: 2022-12-22 | End: 2023-01-01

## 2022-12-22 NOTE — TELEPHONE ENCOUNTER
Pt started with sneezing and nasal congestion very late Sunday into early Monday morning. Pt sounds very course. Pt c/o cough, chest congestion. No fever, temp: 97.9. Pt test positive for covid on Monday 12/19. No sore throat reported, however, pt throat is red with white spot. Pt is taking Benadryl and Vitamin C, D, and Zine otc. Pt is interested in medication to help with s/s.

## 2022-12-22 NOTE — TELEPHONE ENCOUNTER
Future appt: Your appointments     Date & Time Appointment Department [facility])    Jan 31, 2023  1:30 PM CST what is this with REF 1 Estefani Dove, [place] ([facility] Ref Lab [place])            29 Josiane Arcos Ref Lab [place]  [id number]  [phone]        Last Appointment with provider:   12/14/2022Boston Nay-  F/u due in 6-12 months  Last appointment at [facility] [place]:  12/14/2022      Metformin refilled 10/5/22 for 3 month supply    Cholesterol, Total (mg/dL)   Date Value   11/21/2022 146     HDL Cholesterol (mg/dL)   Date Value   11/21/2022 58     LDL Cholesterol (mg/dL)   Date Value   11/21/2022 73     Triglycerides (mg/dL)   Date Value   11/21/2022 76     Lab Results   Component Value Date     (H) 11/21/2022    A1C 6.5 (H) 11/21/2022     Lab Results   Component Value Date    TSH 1.850 11/21/2022       No follow-ups on file.

## 2022-12-22 NOTE — TELEPHONE ENCOUNTER
Pt is covid +, wants to know if dr could give her something for current sxs,  Feels like it is sitting in her chest, is not able to talk very much , sob, - did not want to make virtual appt prior to talking to nurse

## 2022-12-22 NOTE — TELEPHONE ENCOUNTER
Discussed with EM-  Pt scheduled for virtual appt this afternoon.     Future Appointments   Date Time Provider Nayeli Ann   12/22/2022  1:30 PM JACKLYN [place] [facility] [place] [facility] [place]   1/31/2023  1:30 PM REF [place] REF [facility] [id number] [address] Ref Syc

## 2022-12-23 RX ORDER — PIOGLITAZONEHYDROCHLORIDE 30 MG/1
TABLET ORAL
Qty: 90 TABLET | Refills: 1 | Status: SHIPPED | OUTPATIENT
Start: 2022-12-23

## 2022-12-23 NOTE — TELEPHONE ENCOUNTER
Future appt: Your appointments     Date & Time Appointment Department [facility])    Jan 31, 2023  1:30 PM CST what is this with REF 1 Estefani Dove, [place] Du ([facility] Ref Lab [place])            [address] Josiane Chavez Renay Ref Lab [place]  [address] [id number]  [phone]        Last Appointment with provider:   12/14/2022- Alonzora Hamman is due in 6-12 months  Last appointment at [facility] [place]:  12/14/2022      Pioglitazone refilled last on 10/6/22 for #90, 0 refills  Cholesterol, Total (mg/dL)   Date Value   11/21/2022 146     HDL Cholesterol (mg/dL)   Date Value   11/21/2022 58     LDL Cholesterol (mg/dL)   Date Value   11/21/2022 73     Triglycerides (mg/dL)   Date Value   11/21/2022 76     Lab Results   Component Value Date     (H) 11/21/2022    A1C 6.5 (H) 11/21/2022     Lab Results   Component Value Date    TSH 1.850 11/21/2022       No follow-ups on file.

## 2023-01-14 DIAGNOSIS — E11.9 CONTROLLED TYPE 2 DIABETES MELLITUS WITHOUT COMPLICATION, WITHOUT LONG-TERM CURRENT USE OF INSULIN (HCC): ICD-10-CM

## 2023-01-14 RX ORDER — BLOOD SUGAR DIAGNOSTIC
STRIP MISCELLANEOUS
Qty: 100 STRIP | Refills: 0 | Status: SHIPPED | OUTPATIENT
Start: 2023-01-14

## 2023-01-14 NOTE — TELEPHONE ENCOUNTER
Future appt: Your appointments     Date & Time Appointment Department [facility])    Jan 31, 2023  1:30 PM CST what is this with REF 1 Estefani Dove, [place] Du ([facility] Ref Lab [place])            29 Josiane Arcos Ref Lab [place]  [address] [address]  [phone]        Last Appointment with provider:   12/14/2022  Last appointment at [facility] [place]:  12/14/2022  Last px: 12/14/2022- recheck in 6 to 12 months    Cholesterol, Total (mg/dL)   Date Value   11/21/2022 146     HDL Cholesterol (mg/dL)   Date Value   11/21/2022 58     LDL Cholesterol (mg/dL)   Date Value   11/21/2022 73     Triglycerides (mg/dL)   Date Value   11/21/2022 76     Lab Results   Component Value Date     (H) 11/21/2022    A1C 6.5 (H) 11/21/2022     Lab Results   Component Value Date    TSH 1.850 11/21/2022       No follow-ups on file.

## 2023-01-31 ENCOUNTER — LABORATORY ENCOUNTER (OUTPATIENT)
Dept: LAB | Age: 73
End: 2023-01-31
Attending: FAMILY MEDICINE
Payer: MEDICARE

## 2023-01-31 DIAGNOSIS — E53.8 B12 DEFICIENCY: ICD-10-CM

## 2023-01-31 LAB — VIT B12 SERPL-MCNC: 890 PG/ML (ref 193–986)

## 2023-01-31 PROCEDURE — 36415 COLL VENOUS BLD VENIPUNCTURE: CPT

## 2023-01-31 PROCEDURE — 82607 VITAMIN B-12: CPT

## 2023-02-23 ENCOUNTER — PATIENT MESSAGE (OUTPATIENT)
Dept: FAMILY MEDICINE CLINIC | Facility: CLINIC | Age: 73
End: 2023-02-23

## 2023-02-23 DIAGNOSIS — E11.9 CONTROLLED TYPE 2 DIABETES MELLITUS WITHOUT COMPLICATION, WITHOUT LONG-TERM CURRENT USE OF INSULIN (HCC): ICD-10-CM

## 2023-02-23 DIAGNOSIS — E11.9 CONTROLLED TYPE 2 DIABETES MELLITUS WITHOUT COMPLICATION, WITHOUT LONG-TERM CURRENT USE OF INSULIN (HCC): Primary | ICD-10-CM

## 2023-02-23 NOTE — TELEPHONE ENCOUNTER
METFORMIN HCL 1000 MG Oral Tab     #180  R-1       Summary: TAKE 1 TABLET TWICE DAILY WITH MEALS        Last refill- 12/211/22  Last appt-  12/14/22, mammogram due in Jan, check b12 level      Future appt:    Last Appointment with provider:   12/14/2022  Last appointment at [facility]:  12/14/2022  Cholesterol, Total (mg/dL)   Date Value   11/21/2022 146     HDL Cholesterol (mg/dL)   Date Value   11/21/2022 58     LDL Cholesterol (mg/dL)   Date Value   11/21/2022 73     Triglycerides (mg/dL)   Date Value   11/21/2022 76     Lab Results   Component Value Date     (H) 11/21/2022    A1C 6.5 (H) 11/21/2022     Lab Results   Component Value Date    TSH 1.850 11/21/2022       No follow-ups on file.

## 2023-02-24 RX ORDER — BLOOD SUGAR DIAGNOSTIC
1 STRIP MISCELLANEOUS DAILY
Qty: 100 STRIP | Refills: 1 | Status: SHIPPED | OUTPATIENT
Start: 2023-02-24

## 2023-02-24 NOTE — TELEPHONE ENCOUNTER
From: Todd Hummel  To: Maine Fuentes MD  Sent: 2/23/2023 6:55 PM CST  Subject: Metformin 1000 mg    My new Rx coverage is [employer]. They were to contact you today for a new prescription. I just received a message from you that a script was sent to [facility] instead. [facility] number is [phone].

## 2023-02-24 NOTE — TELEPHONE ENCOUNTER
Pt confirmed, she needs test strips sent to  [facility] (they are preferred for diabetic supplies per insurance)

## 2023-02-24 NOTE — TELEPHONE ENCOUNTER
Please confirm-- metformin sent to [facility] yesterday, note from pt to send to [facility] this am. Now request for third pharmacy.

## 2023-02-24 NOTE — TELEPHONE ENCOUNTER
Future appt:    Last Appointment with provider:   12/14/2022  Last appointment at [facility] [place]:  12/14/2022    Pt request noted- request for renewal of test strips, last refilled on 1/14/23  Current request below noted for Karen Fatima. Entered for review. Cholesterol, Total (mg/dL)   Date Value   11/21/2022 146     HDL Cholesterol (mg/dL)   Date Value   11/21/2022 58     LDL Cholesterol (mg/dL)   Date Value   11/21/2022 73     Triglycerides (mg/dL)   Date Value   11/21/2022 76     Lab Results   Component Value Date     (H) 11/21/2022    A1C 6.5 (H) 11/21/2022     Lab Results   Component Value Date    TSH 1.850 11/21/2022       No follow-ups on file.

## 2023-02-24 NOTE — TELEPHONE ENCOUNTER
From: Parveen Andrea  To: Rosendo Fraga MD  Sent: 2/23/2023 6:58 PM CST  Subject: Test strips - new pharmacy    Please send a new prescription to [facility]. Thank you.

## 2023-03-03 ENCOUNTER — TELEPHONE (OUTPATIENT)
Dept: FAMILY MEDICINE CLINIC | Facility: CLINIC | Age: 73
End: 2023-03-03

## 2023-03-03 NOTE — TELEPHONE ENCOUNTER
Called to number given - [employer] clinical review. Diagnosis of Familial multiple lipoprotein-type hyperlipidemia give for patient's Fenofibrate.

## 2023-03-03 NOTE — TELEPHONE ENCOUNTER
Dale Luna from [facility] is requesting dx code  And any failed medication per this dx code  [phone] option 3  Ref # [id number]

## 2023-03-06 ENCOUNTER — TELEPHONE (OUTPATIENT)
Dept: FAMILY MEDICINE CLINIC | Facility: CLINIC | Age: 73
End: 2023-03-06

## 2023-03-06 NOTE — TELEPHONE ENCOUNTER
Patient states she has been to [facility] 3 times to pick her test strips and keeps being told that they are needing further information from the provider before they can be dispensed. Informed patient I will contact the pharmacy and return her call. Spoke with Radha Salamanca at the pharmacy. States she is going to call patient's insurance company to obtain a CMN form that Dr. Jeancarlos Sanchez can complete. Patient informed.

## 2023-03-07 NOTE — TELEPHONE ENCOUNTER
Spoke with Evangelist Sanders at [place] [address],  Test strips are ready for  for $3.33. Pt stated she received notice yesterday. Pt appreciated call.

## 2023-03-08 ENCOUNTER — TELEPHONE (OUTPATIENT)
Dept: FAMILY MEDICINE CLINIC | Facility: CLINIC | Age: 73
End: 2023-03-08

## 2023-03-08 NOTE — TELEPHONE ENCOUNTER
Fax from [employer]- Fenofibrate was denied. Question from insurance:  Has pt tried Gemfibrozil    Pt contacted. Pt states   Pt states she did try Gemfibrozil - years ago. Pt could not remember if it was effect for her or nont. Pt states she had [facility] run RX under the [facility] appt- pt was able to get prescription for $19.  Running RX under insurance - pt would need to pay $54.    At this time, pt states she can use [facility] for discounted rate.

## 2023-04-25 ENCOUNTER — PATIENT MESSAGE (OUTPATIENT)
Dept: FAMILY MEDICINE CLINIC | Facility: CLINIC | Age: 73
End: 2023-04-25

## 2023-04-25 DIAGNOSIS — D12.2 ADENOMATOUS POLYP OF ASCENDING COLON: ICD-10-CM

## 2023-04-25 DIAGNOSIS — K58.2 IRRITABLE BOWEL SYNDROME WITH BOTH CONSTIPATION AND DIARRHEA: ICD-10-CM

## 2023-04-25 DIAGNOSIS — K27.9 PUD (PEPTIC ULCER DISEASE): Primary | ICD-10-CM

## 2023-04-25 DIAGNOSIS — K21.00 GASTROESOPHAGEAL REFLUX DISEASE WITH ESOPHAGITIS WITHOUT HEMORRHAGE: ICD-10-CM

## 2023-04-25 NOTE — TELEPHONE ENCOUNTER
From: Юлия Dupont  To: Makenzie Garcia MD  Sent: 4/25/2023 8:02 AM CDT  Subject: Colonoscopy     Before I schedule with Trungdanny Diaz (I do like him) with my gut issues, should I see a gastroenterologist instead? If so, who do you recommend? Not Taras Ahumada. Thanks. Update eye exam. Completed 4/14/23 Dr Hai Gill.

## 2023-04-25 NOTE — TELEPHONE ENCOUNTER
Pt was seen for px on 12/14/23. Pt was advised then that colonoscopy was due in 2023. Pt question routed to PCP for review.

## 2023-05-23 ENCOUNTER — PATIENT MESSAGE (OUTPATIENT)
Dept: FAMILY MEDICINE CLINIC | Facility: CLINIC | Age: 73
End: 2023-05-23

## 2023-05-23 DIAGNOSIS — E78.49 FAMILIAL MULTIPLE LIPOPROTEIN-TYPE HYPERLIPIDEMIA: ICD-10-CM

## 2023-05-23 DIAGNOSIS — E11.9 CONTROLLED TYPE 2 DIABETES MELLITUS WITHOUT COMPLICATION, WITHOUT LONG-TERM CURRENT USE OF INSULIN (HCC): Primary | ICD-10-CM

## 2023-05-25 ENCOUNTER — PATIENT MESSAGE (OUTPATIENT)
Dept: FAMILY MEDICINE CLINIC | Facility: CLINIC | Age: 73
End: 2023-05-25

## 2023-05-25 RX ORDER — PANTOPRAZOLE SODIUM 40 MG/1
TABLET, DELAYED RELEASE ORAL
Qty: 90 TABLET | Refills: 0 | Status: SHIPPED | OUTPATIENT
Start: 2023-05-25

## 2023-05-25 NOTE — TELEPHONE ENCOUNTER
From: Jaime Arrington  To: Lisa Smalls MD  Sent: 5/25/2023 9:45 AM CDT  Subject: Pantoprazole Rx to different pharmacy    Please send Rx to [facility] for Pantoprazole 40 mg. 90 day supply. I have only 4 left so will not use mail order.

## 2023-05-30 NOTE — TELEPHONE ENCOUNTER
Future Appointments   Date Time Provider Nayeli Ann   6/16/2023  9:30 AM REF [facility]   6/21/2023  2:00 PM Gregorio Blackburn MD [facility] [place]   8/17/2023  3:45 PM Mason Horn, [address]   8/17/2023  4:00 PM Lisy Pereyra, [address]

## 2023-05-31 RX ORDER — PANTOPRAZOLE SODIUM 40 MG/1
TABLET, DELAYED RELEASE ORAL
Qty: 90 TABLET | Refills: 0 | OUTPATIENT
Start: 2023-05-31

## 2023-06-05 ENCOUNTER — LAB ENCOUNTER (OUTPATIENT)
Dept: LAB | Age: 73
End: 2023-06-05
Attending: FAMILY MEDICINE
Payer: MEDICARE

## 2023-06-05 DIAGNOSIS — R19.7 DIARRHEA, UNSPECIFIED TYPE: ICD-10-CM

## 2023-06-05 DIAGNOSIS — R76.8 LOW SERUM IGA FOR AGE: ICD-10-CM

## 2023-06-05 DIAGNOSIS — E78.49 FAMILIAL MULTIPLE LIPOPROTEIN-TYPE HYPERLIPIDEMIA: ICD-10-CM

## 2023-06-05 DIAGNOSIS — E11.9 CONTROLLED TYPE 2 DIABETES MELLITUS WITHOUT COMPLICATION, WITHOUT LONG-TERM CURRENT USE OF INSULIN (HCC): ICD-10-CM

## 2023-06-05 LAB
ALBUMIN SERPL-MCNC: 4.2 G/DL (ref 3.4–5)
ALBUMIN/GLOB SERPL: 1.3 {RATIO} (ref 1–2)
ALP LIVER SERPL-CCNC: 32 U/L
ALT SERPL-CCNC: 28 U/L
ANION GAP SERPL CALC-SCNC: 5 MMOL/L (ref 0–18)
AST SERPL-CCNC: 26 U/L (ref 15–37)
BILIRUB SERPL-MCNC: 0.4 MG/DL (ref 0.1–2)
BILIRUB UR QL STRIP.AUTO: NEGATIVE
BUN BLD-MCNC: 21 MG/DL (ref 7–18)
CALCIUM BLD-MCNC: 9.6 MG/DL (ref 8.5–10.1)
CHLORIDE SERPL-SCNC: 108 MMOL/L (ref 98–112)
CHOLEST SERPL-MCNC: 127 MG/DL (ref ?–200)
CLARITY UR REFRACT.AUTO: CLEAR
CO2 SERPL-SCNC: 27 MMOL/L (ref 21–32)
COLOR UR AUTO: COLORLESS
CREAT BLD-MCNC: 0.72 MG/DL
CREAT UR-SCNC: 14.6 MG/DL
EST. AVERAGE GLUCOSE BLD GHB EST-MCNC: 146 MG/DL (ref 68–126)
FASTING PATIENT LIPID ANSWER: YES
FASTING STATUS PATIENT QL REPORTED: YES
GFR SERPLBLD BASED ON 1.73 SQ M-ARVRAT: 89 ML/MIN/1.73M2 (ref 60–?)
GLOBULIN PLAS-MCNC: 3.3 G/DL (ref 2.8–4.4)
GLUCOSE BLD-MCNC: 98 MG/DL (ref 70–99)
GLUCOSE UR STRIP.AUTO-MCNC: NEGATIVE MG/DL
HBA1C MFR BLD: 6.7 % (ref ?–5.7)
HDLC SERPL-MCNC: 47 MG/DL (ref 40–59)
IGA SERPL-MCNC: 60.1 MG/DL (ref 70–312)
KETONES UR STRIP.AUTO-MCNC: NEGATIVE MG/DL
LDLC SERPL CALC-MCNC: 62 MG/DL (ref ?–100)
LEUKOCYTE ESTERASE UR QL STRIP.AUTO: NEGATIVE
MICROALBUMIN UR-MCNC: 0.54 MG/DL
MICROALBUMIN/CREAT 24H UR-RTO: 37 UG/MG (ref ?–30)
NITRITE UR QL STRIP.AUTO: NEGATIVE
NONHDLC SERPL-MCNC: 80 MG/DL (ref ?–130)
OSMOLALITY SERPL CALC.SUM OF ELEC: 293 MOSM/KG (ref 275–295)
PH UR STRIP.AUTO: 7 [PH] (ref 5–8)
POTASSIUM SERPL-SCNC: 4.4 MMOL/L (ref 3.5–5.1)
PROT SERPL-MCNC: 7.5 G/DL (ref 6.4–8.2)
PROT UR STRIP.AUTO-MCNC: NEGATIVE MG/DL
RBC UR QL AUTO: NEGATIVE
SODIUM SERPL-SCNC: 140 MMOL/L (ref 136–145)
SP GR UR STRIP.AUTO: 1 (ref 1–1.03)
TRIGL SERPL-MCNC: 93 MG/DL (ref 30–149)
UROBILINOGEN UR STRIP.AUTO-MCNC: <2 MG/DL
VLDLC SERPL CALC-MCNC: 14 MG/DL (ref 0–30)

## 2023-06-05 PROCEDURE — 83036 HEMOGLOBIN GLYCOSYLATED A1C: CPT

## 2023-06-05 PROCEDURE — 82784 ASSAY IGA/IGD/IGG/IGM EACH: CPT

## 2023-06-05 PROCEDURE — 81003 URINALYSIS AUTO W/O SCOPE: CPT

## 2023-06-05 PROCEDURE — 80053 COMPREHEN METABOLIC PANEL: CPT

## 2023-06-05 PROCEDURE — 82570 ASSAY OF URINE CREATININE: CPT

## 2023-06-05 PROCEDURE — 86364 TISS TRNSGLTMNASE EA IG CLAS: CPT

## 2023-06-05 PROCEDURE — 36415 COLL VENOUS BLD VENIPUNCTURE: CPT

## 2023-06-05 PROCEDURE — 80061 LIPID PANEL: CPT

## 2023-06-05 PROCEDURE — 82043 UR ALBUMIN QUANTITATIVE: CPT

## 2023-06-06 LAB — TTG IGA SER-ACNC: <0.2 U/ML (ref ?–7)

## 2023-06-08 LAB
IGA SERPL-MCNC: 63.3 MG/DL (ref 70–312)
IGM SERPL-MCNC: 34.1 MG/DL (ref 43–279)
IMMUNOGLOBULIN PNL SER-MCNC: 989 MG/DL (ref 791–1643)

## 2023-06-09 ENCOUNTER — OFFICE VISIT (OUTPATIENT)
Dept: FAMILY MEDICINE CLINIC | Facility: CLINIC | Age: 73
End: 2023-06-09
Payer: MEDICARE

## 2023-06-09 VITALS
HEART RATE: 80 BPM | BODY MASS INDEX: 23.27 KG/M2 | OXYGEN SATURATION: 98 % | SYSTOLIC BLOOD PRESSURE: 132 MMHG | TEMPERATURE: 98 F | WEIGHT: 139.63 LBS | DIASTOLIC BLOOD PRESSURE: 82 MMHG | HEIGHT: 65 IN

## 2023-06-09 DIAGNOSIS — E78.49 FAMILIAL MULTIPLE LIPOPROTEIN-TYPE HYPERLIPIDEMIA: Primary | ICD-10-CM

## 2023-06-09 DIAGNOSIS — E11.9 CONTROLLED TYPE 2 DIABETES MELLITUS WITHOUT COMPLICATION, WITHOUT LONG-TERM CURRENT USE OF INSULIN (HCC): ICD-10-CM

## 2023-06-09 DIAGNOSIS — E55.9 VITAMIN D DEFICIENCY: ICD-10-CM

## 2023-06-09 PROCEDURE — 90715 TDAP VACCINE 7 YRS/> IM: CPT | Performed by: FAMILY MEDICINE

## 2023-06-09 PROCEDURE — 99214 OFFICE O/P EST MOD 30 MIN: CPT | Performed by: FAMILY MEDICINE

## 2023-06-09 PROCEDURE — 90471 IMMUNIZATION ADMIN: CPT | Performed by: FAMILY MEDICINE

## 2023-06-09 RX ORDER — BLOOD SUGAR DIAGNOSTIC
1 STRIP MISCELLANEOUS DAILY
Qty: 100 STRIP | Refills: 3 | Status: SHIPPED | OUTPATIENT
Start: 2023-06-09

## 2023-06-09 RX ORDER — FENOFIBRATE 160 MG/1
160 TABLET ORAL NIGHTLY
Qty: 90 TABLET | Refills: 3 | Status: SHIPPED | OUTPATIENT
Start: 2023-06-09

## 2023-06-09 RX ORDER — PIOGLITAZONEHYDROCHLORIDE 30 MG/1
30 TABLET ORAL DAILY
Qty: 90 TABLET | Refills: 1 | Status: SHIPPED | OUTPATIENT
Start: 2023-06-09

## 2023-06-09 RX ORDER — PANTOPRAZOLE SODIUM 40 MG/1
40 TABLET, DELAYED RELEASE ORAL DAILY
Qty: 90 TABLET | Refills: 3 | Status: SHIPPED | OUTPATIENT
Start: 2023-06-09

## 2023-06-09 RX ORDER — POLYETHYLENE GLYCOL-3350 AND ELECTROLYTES WITH FLAVOR PACK 240; 5.84; 2.98; 6.72; 22.72 G/278.26G; G/278.26G; G/278.26G; G/278.26G; G/278.26G
POWDER, FOR SOLUTION ORAL
COMMUNITY
Start: 2023-05-01

## 2023-06-09 NOTE — PATIENT INSTRUCTIONS
Boostrix vaccine today    Rec complete shingle vaccine    Continue medications    Continue exercise    Medicare physical and labs 12/ 23

## 2023-06-10 ENCOUNTER — PATIENT MESSAGE (OUTPATIENT)
Dept: FAMILY MEDICINE CLINIC | Facility: CLINIC | Age: 73
End: 2023-06-10

## 2023-06-12 LAB — TTG IGG SER-ACNC: 1.3 U/ML (ref ?–7)

## 2023-06-12 NOTE — TELEPHONE ENCOUNTER
From: [facility]  To: Wilton Briggs MD  Sent: 6/10/2023 11:57 AM CDT  Subject: Shingrix    For my records:  I received Vaccine yesterday 6/9/23.

## 2023-08-18 PROBLEM — K21.9 GERD (GASTROESOPHAGEAL REFLUX DISEASE): Status: ACTIVE | Noted: 2023-08-18

## 2023-08-18 PROBLEM — Z86.010 PERSONAL HISTORY OF COLONIC POLYPS: Status: ACTIVE | Noted: 2023-08-18

## 2023-08-18 PROBLEM — D12.5 BENIGN NEOPLASM OF SIGMOID COLON: Status: ACTIVE | Noted: 2023-08-18

## 2023-08-18 PROBLEM — R19.7 DIARRHEA: Status: ACTIVE | Noted: 2023-08-18

## 2023-08-18 PROBLEM — D12.2 BENIGN NEOPLASM OF ASCENDING COLON: Status: ACTIVE | Noted: 2023-08-18

## 2023-09-10 ENCOUNTER — PATIENT MESSAGE (OUTPATIENT)
Dept: FAMILY MEDICINE CLINIC | Facility: CLINIC | Age: 73
End: 2023-09-10

## 2023-09-11 ENCOUNTER — OFFICE VISIT (OUTPATIENT)
Dept: FAMILY MEDICINE CLINIC | Facility: CLINIC | Age: 73
End: 2023-09-11
Payer: MEDICARE

## 2023-09-11 VITALS
WEIGHT: 137.81 LBS | OXYGEN SATURATION: 97 % | HEIGHT: 65 IN | RESPIRATION RATE: 16 BRPM | SYSTOLIC BLOOD PRESSURE: 140 MMHG | HEART RATE: 70 BPM | DIASTOLIC BLOOD PRESSURE: 98 MMHG | BODY MASS INDEX: 22.96 KG/M2 | TEMPERATURE: 97 F

## 2023-09-11 DIAGNOSIS — I10 HYPERTENSION, UNSPECIFIED TYPE: Primary | ICD-10-CM

## 2023-09-11 DIAGNOSIS — M79.672 FOOT PAIN, LEFT: ICD-10-CM

## 2023-09-11 PROCEDURE — 99213 OFFICE O/P EST LOW 20 MIN: CPT

## 2023-09-11 NOTE — TELEPHONE ENCOUNTER
Pt contacted-  Pt states she was bare foot in her kitchen on Friday,  Pt states she stepped on what pt thought was a pebble. Pt states it felt like a sand. Pt states she didn't break anything, but she pulled a very small \"what pt believes was a  shard of glass\" just under her fourth digit on ball of her left foot. Pt state she barely had a drop of blood. Pt states by the end of day Friday that area was tender. Pt states there is no swelling, no redness, no drainage. Pt states she doesn't feel anything with shoes, otherwise is sore when barefoot. Pt believes she still may have something in the ball of her foot, but pt cannot see anything. Pt requested appt today. Appt given with EM.     Future Appointments   Date Time Provider Nayeli Ann   9/11/2023 10:30 AM JAKCLYN Bird [facility] [place]   9/18/2023 11:00 AM Bahman Pereyra DO [facility]   12/1/2023  8:15 AM REF [facility]   12/5/2023  8:30 AM Keegan Alvarez MD [facility] [place]

## 2023-09-11 NOTE — TELEPHONE ENCOUNTER
From: Harry Rizzo  To: Macy Mckeon MD  Sent: 9/10/2023 7:27 PM CDT  Subject: Foot    I removed i tiny shard of glass Friday morning. It had only a few drops of blood I squeezed out. It felt fine. I walked on it all day before it was a little sensitive. As long as I have shoes on I hardly feel it, but I think there is something in it because it hurts when there is no cushioning or when I press on it a certain way. I soaked in Epsom salts 40 minutes. Can I see a nurse practitioner Monday?  Thanks

## 2023-09-25 ENCOUNTER — OFFICE VISIT (OUTPATIENT)
Dept: FAMILY MEDICINE CLINIC | Facility: CLINIC | Age: 73
End: 2023-09-25
Payer: MEDICARE

## 2023-09-25 VITALS
BODY MASS INDEX: 22.46 KG/M2 | DIASTOLIC BLOOD PRESSURE: 70 MMHG | HEIGHT: 65 IN | HEART RATE: 82 BPM | RESPIRATION RATE: 16 BRPM | OXYGEN SATURATION: 98 % | SYSTOLIC BLOOD PRESSURE: 134 MMHG | WEIGHT: 134.81 LBS | TEMPERATURE: 97 F

## 2023-09-25 DIAGNOSIS — M25.552 LEFT HIP PAIN: ICD-10-CM

## 2023-09-25 DIAGNOSIS — I10 HYPERTENSION, UNSPECIFIED TYPE: Primary | ICD-10-CM

## 2023-09-25 PROCEDURE — 99214 OFFICE O/P EST MOD 30 MIN: CPT | Performed by: NURSE PRACTITIONER

## 2023-09-25 RX ORDER — LISINOPRIL 5 MG/1
5 TABLET ORAL DAILY
Qty: 30 TABLET | Refills: 3 | Status: SHIPPED | OUTPATIENT
Start: 2023-09-25

## 2023-09-25 NOTE — PATIENT INSTRUCTIONS
X-ray left hip Joselin Barfield  - [phone]-  consider physical therapy      Monitor blood pressure - if it runs >140/90 then start lisinopril 10 mg daily -  if it is consistently < 140/90 then do not take med. Follow up in 2 weeks with blood pressure numbers.

## (undated) DIAGNOSIS — E11.9 CONTROLLED TYPE 2 DIABETES MELLITUS WITHOUT COMPLICATION, WITHOUT LONG-TERM CURRENT USE OF INSULIN (HCC): ICD-10-CM

## (undated) NOTE — MR AVS SNAPSHOT
[address]  [place] [id number]  [phone]               Thank you for choosing us for your health care visit with Richard Echevarria MD.  We are glad to serve you and happy to provide you with this summary One daily           Magnesium 400 MG Tabs   Take 400 mg by mouth daily. MetFORMIN HCl 850 MG Tabs   Take 850 mg by mouth 2 (two) times daily. Commonly known as:  GLUCOPHAGE           Omeprazole 40 MG Cpdr   Take 40 mg by mouth daily. Weight Reduction Maintain normal body weight (body mass index 18.5-24.9 kg/m2)   DASH eating plan Adopt a diet rich in fruits, vegetables, and low fat dairy products with reduced content of saturated and total fat.    Dietary sodium reduction Reduce dietary ? Cover porch steps with gritty weather proof paint. ? Pay attention to curbs and other changes in surfaces when out in the community. ? Take care when walking on gravel or grassy surfaces. ? Avoid walking on snowy or icy surfaces. ?  Use a cane or walk

## (undated) NOTE — LETTER
01/07/19        [place] [id number]      Dear Hawa Schaefer,    [id number] records indicate that you have outstanding lab work and or testing that was ordered for you and has not yet been completed:  Orders Placed This Encounter